# Patient Record
Sex: FEMALE | Race: WHITE | HISPANIC OR LATINO
[De-identification: names, ages, dates, MRNs, and addresses within clinical notes are randomized per-mention and may not be internally consistent; named-entity substitution may affect disease eponyms.]

---

## 2017-02-11 ENCOUNTER — HOSPITAL (OUTPATIENT)
Dept: OTHER | Age: 22
End: 2017-02-11
Attending: EMERGENCY MEDICINE

## 2017-02-11 LAB
CONTROL LINE: PRESENT
Lab: CLEAR
UA APPEAR POC: ABNORMAL
UA BILI POC: ABNORMAL
UA BLOOD POC: ABNORMAL
UA COLOR POC: ABNORMAL
UA GLUCOSE POC: NEGATIVE
UA KETONES POC: ABNORMAL
UA LEUK EST POC: ABNORMAL
UA NITRITE POC: POSITIVE
UA PH POC: 6 (ref 5–7)
UA PROTEIN POC: ABNORMAL
UA SPEC GRAV POC: 1.03 (ref 1.01–1.02)
UA UROBILIN POC: 1 MG/DL
URINE PREG POC: NEGATIVE

## 2017-03-06 ENCOUNTER — HOSPITAL (OUTPATIENT)
Dept: OTHER | Age: 22
End: 2017-03-06
Attending: EMERGENCY MEDICINE

## 2017-03-06 LAB
CONTROL LINE: PRESENT
Lab: CLEAR
UA APPEAR POC: ABNORMAL
UA BILI POC: ABNORMAL
UA BLOOD POC: NEGATIVE
UA COLOR POC: YELLOW
UA GLUCOSE POC: NEGATIVE
UA KETONES POC: NEGATIVE
UA LEUK EST POC: NEGATIVE
UA NITRITE POC: NEGATIVE
UA PH POC: 6 (ref 5–7)
UA PROTEIN POC: ABNORMAL
UA SPEC GRAV POC: 1.02 (ref 1.01–1.02)
UA UROBILIN POC: 1 MG/DL
URINE PREG POC: NEGATIVE

## 2017-03-08 ENCOUNTER — HOSPITAL (OUTPATIENT)
Dept: OTHER | Age: 22
End: 2017-03-08
Attending: FAMILY MEDICINE

## 2017-03-08 LAB
A/G RATIO_: 1.4
ABS LYMPH: 1.8 K/CUMM (ref 1–3.5)
ABS MONO: 0.5 K/CUMM (ref 0.1–0.8)
ABS NEUTRO: 6.5 K/CUMM (ref 2–8)
ALBUMIN: 4.2 G/DL (ref 3.5–5)
ALK PHOS: 89 UNIT/L (ref 50–124)
ALT/GPT: 9 UNIT/L (ref 0–55)
ANION GAP SERPL CALC-SCNC: 13 MEQ/L (ref 10–20)
AST/GOT: 15 UNIT/L (ref 5–34)
BASOPHIL: 0 % (ref 0–1)
BILI TOTAL: 0.9 MG/DL (ref 0.2–1)
BUN SERPL-MCNC: 10 MG/DL (ref 6–20)
CALCIUM: 9.2 MG/DL (ref 8.4–10.2)
CHLORIDE: 107 MEQ/L (ref 97–107)
CREATININE: 0.84 MG/DL (ref 0.6–1.3)
DIFF_TYPE?: NORMAL
EOSINOPHIL: 1 % (ref 0–6)
GLOBULIN_: 3.1 G/DL (ref 2–4.1)
GLUCOSE LVL: 114 MG/DL (ref 70–99)
HCT VFR BLD CALC: 41 % (ref 33–45)
HEMOLYSIS 2+: NEGATIVE
HGB BLD-MCNC: 13.5 G/DL (ref 11–15)
IMMATURE GRAN: 0.2 % (ref 0–0.3)
INSTR WBC: 8.8 K/CUMM (ref 4–11)
LIPEMIC 3+: NEGATIVE
LYMPHOCYTE: 20 %
MCH RBC QN AUTO: 30 PG (ref 25–35)
MCHC RBC AUTO-ENTMCNC: 33 G/DL (ref 32–37)
MCV RBC AUTO: 90 FL (ref 78–97)
MONOCYTE: 5 %
NEUTROPHIL: 74 %
NRBC BLD MANUAL-RTO: 0 % (ref 0–0.2)
PLATELET: 384 K/CUMM (ref 150–450)
POTASSIUM: 4.2 MEQ/L (ref 3.5–5.1)
RBC # BLD: 4.49 M/CUMM (ref 3.7–5.2)
RDW: 12.9 % (ref 11.5–14.5)
SODIUM: 140 MEQ/L (ref 136–145)
TCO2: 24 MEQ/L (ref 19–29)
TOTAL PROTEIN: 7.3 G/DL (ref 6.4–8.3)
WBC # BLD: 8.8 K/CUMM (ref 4–11)

## 2017-03-18 ENCOUNTER — HOSPITAL (OUTPATIENT)
Dept: OTHER | Age: 22
End: 2017-03-18
Attending: NURSE PRACTITIONER

## 2017-05-25 ENCOUNTER — HOSPITAL (OUTPATIENT)
Dept: OTHER | Age: 22
End: 2017-05-25
Attending: NURSE PRACTITIONER

## 2017-05-25 LAB
CONTROL LINE: PRESENT
Lab: CLEAR
UA APPEAR POC: CLEAR
UA BILI POC: NEGATIVE
UA BLOOD POC: NEGATIVE
UA COLOR POC: YELLOW
UA GLUCOSE POC: NEGATIVE
UA KETONES POC: NEGATIVE
UA LEUK EST POC: NEGATIVE
UA NITRITE POC: NEGATIVE
UA PH POC: 6.5 (ref 5–7)
UA PROTEIN POC: NEGATIVE
UA SPEC GRAV POC: 1.02 (ref 1.01–1.02)
UA UROBILIN POC: 0.2 MG/DL
URINE PREG POC: NEGATIVE

## 2017-07-06 ENCOUNTER — HOSPITAL (OUTPATIENT)
Dept: OTHER | Age: 22
End: 2017-07-06
Attending: EMERGENCY MEDICINE

## 2017-07-06 LAB
CONTROL LINE: PRESENT
Lab: CLEAR
UA APPEAR POC: ABNORMAL
UA BILI POC: NEGATIVE
UA BLOOD POC: NEGATIVE
UA COLOR POC: YELLOW
UA GLUCOSE POC: NEGATIVE
UA KETONES POC: NEGATIVE
UA LEUK EST POC: NEGATIVE
UA NITRITE POC: NEGATIVE
UA PH POC: 7.5 (ref 5–7)
UA PROTEIN POC: ABNORMAL
UA SPEC GRAV POC: 1.02 (ref 1.01–1.02)
UA UROBILIN POC: 0.2 MG/DL
URINE PREG POC: NEGATIVE

## 2017-10-08 ENCOUNTER — HOSPITAL (OUTPATIENT)
Dept: OTHER | Age: 22
End: 2017-10-08
Attending: PHYSICIAN ASSISTANT

## 2018-06-06 ENCOUNTER — OFFICE VISIT (OUTPATIENT)
Dept: OBGYN CLINIC | Facility: CLINIC | Age: 23
End: 2018-06-06

## 2018-06-06 VITALS
WEIGHT: 207 LBS | SYSTOLIC BLOOD PRESSURE: 120 MMHG | DIASTOLIC BLOOD PRESSURE: 80 MMHG | HEIGHT: 60 IN | RESPIRATION RATE: 12 BRPM | TEMPERATURE: 98 F | BODY MASS INDEX: 40.64 KG/M2 | HEART RATE: 80 BPM

## 2018-06-06 DIAGNOSIS — N89.8 VAGINAL DISCHARGE: ICD-10-CM

## 2018-06-06 DIAGNOSIS — Z11.3 SCREEN FOR SEXUALLY TRANSMITTED DISEASES: ICD-10-CM

## 2018-06-06 DIAGNOSIS — R10.2 ACUTE PELVIC PAIN: Primary | ICD-10-CM

## 2018-06-06 DIAGNOSIS — Z87.42 HISTORY OF OVARIAN CYST: ICD-10-CM

## 2018-06-06 DIAGNOSIS — L70.9 ACNE, UNSPECIFIED ACNE TYPE: ICD-10-CM

## 2018-06-06 DIAGNOSIS — Z32.00 POSSIBLE PREGNANCY, NOT CONFIRMED: ICD-10-CM

## 2018-06-06 DIAGNOSIS — N91.2 AMENORRHEA: ICD-10-CM

## 2018-06-06 DIAGNOSIS — N92.6 IRREGULAR MENSES: ICD-10-CM

## 2018-06-06 DIAGNOSIS — L68.0 HIRSUTISM: ICD-10-CM

## 2018-06-06 PROCEDURE — 87660 TRICHOMONAS VAGIN DIR PROBE: CPT | Performed by: NURSE PRACTITIONER

## 2018-06-06 PROCEDURE — 87510 GARDNER VAG DNA DIR PROBE: CPT | Performed by: NURSE PRACTITIONER

## 2018-06-06 PROCEDURE — 81025 URINE PREGNANCY TEST: CPT | Performed by: NURSE PRACTITIONER

## 2018-06-06 PROCEDURE — 87591 N.GONORRHOEAE DNA AMP PROB: CPT | Performed by: NURSE PRACTITIONER

## 2018-06-06 PROCEDURE — 88175 CYTOPATH C/V AUTO FLUID REDO: CPT | Performed by: NURSE PRACTITIONER

## 2018-06-06 PROCEDURE — 99204 OFFICE O/P NEW MOD 45 MIN: CPT | Performed by: NURSE PRACTITIONER

## 2018-06-06 PROCEDURE — 87480 CANDIDA DNA DIR PROBE: CPT | Performed by: NURSE PRACTITIONER

## 2018-06-06 PROCEDURE — 87491 CHLMYD TRACH DNA AMP PROBE: CPT | Performed by: NURSE PRACTITIONER

## 2018-06-06 NOTE — PROGRESS NOTES
GYN H&P     2018  12:56 PM    CC: Patient is here to establish care/Discuss her discharge and pain    HPI: Patient is a 26year old  here with complaints of abnormal vaginal discharge.  Describes as \"increased yellow discharge with odor\" for pa status: Never Smoker    Smokeless tobacco: Never Used    Alcohol use No    Drug use: No    Sexual activity: Yes    Partners: Male     Other Topics Concern    Caffeine Concern No    Exercise No    Seat Belt Yes     Social History Narrative   None on file Patient is 26year old female   1. Acute pelvic pain    2. Possible pregnancy, not confirmed    3. Amenorrhea    4. History of ovarian cyst    5. Vaginal discharge    6. Irregular menses    7. Hirsutism    8. Acne, unspecified acne type    9.  Screen for se Future    7. Hirsutism    - [facility]; Future  - ESTRADIOL; Future  - LH (LUTEINIZING HORMONE); Future  - PROLACTIN; Future  - TSH W REFLEX TO FREE T4; Future  - TESTOSTERONE,FREE AND TOTAL; Future  - DEHYDROEPIANDROSTERONE SULFATE; Future    8.  Acne, unspecifie

## 2018-06-06 NOTE — PROGRESS NOTES
Pt states she has irregular periods. LMP was in April. Has unprotected sex with eriche. Pt states she is having discharge starting this morning. Area feels warm and burning but no itching.

## 2018-06-07 RX ORDER — FLUCONAZOLE 150 MG/1
TABLET ORAL
Qty: 2 TABLET | Refills: 0 | Status: SHIPPED | OUTPATIENT
Start: 2018-06-07 | End: 2018-07-17 | Stop reason: ALTCHOICE

## 2018-06-18 ENCOUNTER — APPOINTMENT (OUTPATIENT)
Dept: LAB | Age: 23
End: 2018-06-18
Attending: NURSE PRACTITIONER
Payer: COMMERCIAL

## 2018-06-18 DIAGNOSIS — N92.6 IRREGULAR MENSES: ICD-10-CM

## 2018-06-18 DIAGNOSIS — L68.0 HIRSUTISM: ICD-10-CM

## 2018-06-18 DIAGNOSIS — L70.9 ACNE, UNSPECIFIED ACNE TYPE: ICD-10-CM

## 2018-06-18 DIAGNOSIS — N91.2 AMENORRHEA: ICD-10-CM

## 2018-06-18 PROCEDURE — 83001 ASSAY OF GONADOTROPIN (FSH): CPT

## 2018-06-18 PROCEDURE — 82627 DEHYDROEPIANDROSTERONE: CPT

## 2018-06-18 PROCEDURE — 83002 ASSAY OF GONADOTROPIN (LH): CPT

## 2018-06-18 PROCEDURE — 36415 COLL VENOUS BLD VENIPUNCTURE: CPT

## 2018-06-18 PROCEDURE — 84146 ASSAY OF PROLACTIN: CPT

## 2018-06-18 PROCEDURE — 84443 ASSAY THYROID STIM HORMONE: CPT

## 2018-06-18 PROCEDURE — 82670 ASSAY OF TOTAL ESTRADIOL: CPT

## 2018-06-18 PROCEDURE — 84402 ASSAY OF FREE TESTOSTERONE: CPT

## 2018-06-18 PROCEDURE — 84403 ASSAY OF TOTAL TESTOSTERONE: CPT

## 2018-06-22 RX ORDER — DROSPIRENONE AND ETHINYL ESTRADIOL 0.02-3(28)
1 KIT ORAL DAILY
Qty: 1 PACKAGE | Refills: 2 | Status: SHIPPED | OUTPATIENT
Start: 2018-06-22

## 2018-07-17 ENCOUNTER — HOSPITAL ENCOUNTER (OUTPATIENT)
Age: 23
Discharge: HOME | End: 2018-07-17
Attending: FAMILY MEDICINE
Payer: COMMERCIAL

## 2018-07-17 VITALS
RESPIRATION RATE: 18 BRPM | HEIGHT: 60 IN | WEIGHT: 200 LBS | HEART RATE: 72 BPM | SYSTOLIC BLOOD PRESSURE: 156 MMHG | DIASTOLIC BLOOD PRESSURE: 86 MMHG | OXYGEN SATURATION: 99 % | TEMPERATURE: 98 F | BODY MASS INDEX: 39.27 KG/M2
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
## 2018-07-17 PROCEDURE — 99204 OFFICE O/P NEW MOD 45 MIN: CPT

## 2018-07-17 PROCEDURE — 99213 OFFICE O/P EST LOW 20 MIN: CPT

## 2018-07-17 RX ORDER — PREDNISONE 20 MG/1
40 TABLET ORAL DAILY
Qty: 10 TABLET | Refills: 0 | Status: SHIPPED | OUTPATIENT
Start: 2018-07-17 | End: 2018-07-22

## 2018-07-17 NOTE — ED INITIAL ASSESSMENT (HPI)
Pt presents today with several insect bites throughout her body. Pt states that she had been staying at a motel and switched rooms and then noticed the bites. Pt states that the bites are itchy. The bites are swollen and red.  Pt denies any fevers or other

## 2018-07-17 NOTE — ED PROVIDER NOTES
Patient Seen in: [address] [facility]    History   Patient presents with:   Insect Bite    Stated Complaint: HOT AND ITCHY BITES ON ARM FOR 5 DAYS    HPI    27-year-old female presents to the immediate care today with the chief compla intact. Fundi benign. Ears: normal TM's and external ear canals both ears  Nose: Nares normal. Septum midline. Mucosa normal. No drainage or sinus tenderness.   Throat: lips, mucosa, and tongue normal; teeth and gums normal and no lip, tongue or throat swe

## 2018-09-24 ENCOUNTER — CHARTING TRANS (OUTPATIENT)
Dept: OTHER | Age: 23
End: 2018-09-24

## 2018-09-24 ENCOUNTER — LAB SERVICES (OUTPATIENT)
Dept: OTHER | Age: 23
End: 2018-09-24

## 2018-09-24 ENCOUNTER — MYAURORA ACCOUNT LINK (OUTPATIENT)
Dept: OTHER | Age: 23
End: 2018-09-24

## 2018-09-24 LAB
BILIRUBIN URINE: NEGATIVE
BLOOD URINE: NEGATIVE
CLARITY: CLEAR
COLOR: YELLOW
GLUCOSE QUALITATIVE U: ABNORMAL
KETONES, URINE: ABNORMAL
LEUKOCYTE ESTERASE URINE: NEGATIVE
NITRITE URINE: NEGATIVE
PH URINE: 6.5 (ref 5–7)
SPECIFIC GRAVITY URINE: 1.02 (ref 1–1.03)
URINE PROTEIN, QUAL (DIPSTICK): NEGATIVE
UROBILINOGEN URINE: ABNORMAL

## 2018-09-25 ENCOUNTER — CHARTING TRANS (OUTPATIENT)
Dept: OTHER | Age: 23
End: 2018-09-25

## 2018-09-25 ENCOUNTER — IMAGING SERVICES (OUTPATIENT)
Dept: OTHER | Age: 23
End: 2018-09-25

## 2018-09-26 ENCOUNTER — LAB SERVICES (OUTPATIENT)
Dept: OTHER | Age: 23
End: 2018-09-26

## 2018-09-26 LAB
FINAL REPORT: NORMAL
HEMATOCRIT: 35 % (ref 34–45)
HEMOGLOBIN: 11.2 G/DL (ref 11.2–15.7)
MEAN CORPUSCULAR HGB CONCENTRATION: 32 % (ref 32–36)
MEAN CORPUSCULAR HGB: 30.4 PG (ref 27–34)
MEAN CORPUSCULAR VOLUME: 94.9 FL (ref 79–95)
MEAN PLATELET VOLUME: 9.6 FL (ref 8.6–12.4)
PLATELET COUNT: 336 10*3/UL (ref 150–400)
RED BLOOD CELL COUNT: 3.69 10*6/UL (ref 3.7–5.2)
RED CELL DISTRIBUTION WIDTH: 12.9 % (ref 11.3–14.8)
WHITE BLOOD CELL COUNT: 9 10*3/UL (ref 4–10)

## 2018-09-27 LAB
ABO + RH BLD: NORMAL
BLD GP AB SCN SERPL QL: NEGATIVE
GLUCOSE 1H P 50 G GLC PO SERPL-MCNC: 133 MG/DL (ref 70–130)
HBV SURFACE AG SERPL QL IA: NEGATIVE
HIV1+2 AB SERPL QL IA: NEGATIVE
RUBV IGG SERPL QL IA: NORMAL [IU]/ML

## 2018-09-30 LAB — RPR SER QL: NORMAL

## 2018-10-04 ENCOUNTER — CHARTING TRANS (OUTPATIENT)
Dept: OTHER | Age: 23
End: 2018-10-04

## 2018-10-04 ENCOUNTER — LAB SERVICES (OUTPATIENT)
Dept: OTHER | Age: 23
End: 2018-10-04

## 2018-10-04 LAB
GLUCOSE 1H P 100 G GLC PO SERPL-MCNC: 222 MG/DL (ref 60–180)
GLUCOSE 2H P 100 G GLC PO SERPL-MCNC: 170 MG/DL (ref 70–155)
GLUCOSE 3H P 100 G GLC PO SERPL-MCNC: 74 MG/DL (ref 70–140)
GLUCOSE P FAST SERPL-MCNC: 95 MG/DL (ref 60–95)

## 2018-10-05 ENCOUNTER — CHARTING TRANS (OUTPATIENT)
Dept: OTHER | Age: 23
End: 2018-10-05

## 2018-10-09 ENCOUNTER — CHARTING TRANS (OUTPATIENT)
Dept: OTHER | Age: 23
End: 2018-10-09

## 2018-10-09 ENCOUNTER — MYAURORA ACCOUNT LINK (OUTPATIENT)
Dept: OTHER | Age: 23
End: 2018-10-09

## 2018-10-15 ENCOUNTER — CHARTING TRANS (OUTPATIENT)
Dept: OTHER | Age: 23
End: 2018-10-15

## 2018-10-16 ENCOUNTER — CHARTING TRANS (OUTPATIENT)
Dept: OTHER | Age: 23
End: 2018-10-16

## 2018-10-23 ENCOUNTER — CHARTING TRANS (OUTPATIENT)
Dept: OTHER | Age: 23
End: 2018-10-23

## 2018-11-20 ENCOUNTER — CHARTING TRANS (OUTPATIENT)
Dept: OTHER | Age: 23
End: 2018-11-20

## 2018-11-26 ENCOUNTER — CHARTING TRANS (OUTPATIENT)
Dept: OTHER | Age: 23
End: 2018-11-26

## 2018-11-28 VITALS — WEIGHT: 240.08 LBS | SYSTOLIC BLOOD PRESSURE: 130 MMHG | DIASTOLIC BLOOD PRESSURE: 82 MMHG

## 2018-12-01 ENCOUNTER — PRIOR ORIGINAL RECORDS (OUTPATIENT)
Dept: HEALTH INFORMATION MANAGEMENT | Facility: OTHER | Age: 23
End: 2018-12-01

## 2018-12-04 ENCOUNTER — LAB SERVICES (OUTPATIENT)
Dept: LAB | Age: 23
End: 2018-12-04

## 2018-12-04 ENCOUNTER — OB CHECK (OUTPATIENT)
Dept: OBGYN | Age: 23
End: 2018-12-04

## 2018-12-04 VITALS — WEIGHT: 243 LBS | DIASTOLIC BLOOD PRESSURE: 86 MMHG | SYSTOLIC BLOOD PRESSURE: 128 MMHG

## 2018-12-04 DIAGNOSIS — Z34.02 ENCOUNTER FOR SUPERVISION OF NORMAL FIRST PREGNANCY IN SECOND TRIMESTER: Primary | ICD-10-CM

## 2018-12-04 DIAGNOSIS — O09.90 SUPERVISION OF HIGH RISK PREGNANCY, ANTEPARTUM: Primary | ICD-10-CM

## 2018-12-04 LAB
BASOPHIL %: 0.1 % (ref 0–1.2)
BASOPHIL ABSOLUTE #: 0 10*3/UL (ref 0–0.1)
DIFFERENTIAL TYPE: ABNORMAL
EOSINOPHIL %: 0.2 % (ref 0–10)
EOSINOPHIL ABSOLUTE #: 0 10*3/UL (ref 0–0.5)
HEMATOCRIT: 36 % (ref 34–45)
HEMOGLOBIN: 11.6 G/DL (ref 11.2–15.7)
LYMPH PERCENT: 15.7 % (ref 20.5–51.1)
LYMPHOCYTE ABSOLUTE #: 1.5 10*3/UL (ref 1.2–3.4)
MEAN CORPUSCULAR HGB CONCENTRATION: 32.2 % (ref 32–36)
MEAN CORPUSCULAR HGB: 30.1 PG (ref 27–34)
MEAN CORPUSCULAR VOLUME: 93.5 FL (ref 79–95)
MEAN PLATELET VOLUME: 9.8 FL (ref 8.6–12.4)
MONOCYTE ABSOLUTE #: 0.5 10*3/UL (ref 0.2–0.9)
MONOCYTE PERCENT: 5.7 % (ref 4.3–12.9)
NEUTROPHIL ABSOLUTE #: 7.4 10*3/UL (ref 1.4–6.5)
NEUTROPHIL PERCENT: 78.3 % (ref 34–73.5)
PLATELET COUNT: 300 10*3/UL (ref 150–400)
RED BLOOD CELL COUNT: 3.85 10*6/UL (ref 3.7–5.2)
RED CELL DISTRIBUTION WIDTH: 13.8 % (ref 11.3–14.8)
WHITE BLOOD CELL COUNT: 9.5 10*3/UL (ref 4–10)

## 2018-12-04 PROCEDURE — 0502F SUBSEQUENT PRENATAL CARE: CPT | Performed by: OBSTETRICS & GYNECOLOGY

## 2018-12-04 PROCEDURE — 85025 COMPLETE CBC W/AUTO DIFF WBC: CPT | Performed by: OBSTETRICS & GYNECOLOGY

## 2018-12-04 PROCEDURE — 86703 HIV-1/HIV-2 1 RESULT ANTBDY: CPT | Performed by: OBSTETRICS & GYNECOLOGY

## 2018-12-04 PROCEDURE — 36415 COLL VENOUS BLD VENIPUNCTURE: CPT | Performed by: OBSTETRICS & GYNECOLOGY

## 2018-12-04 RX ORDER — BLOOD-GLUCOSE METER
EACH MISCELLANEOUS
COMMUNITY
Start: 2018-10-05 | End: 2019-01-21 | Stop reason: ALTCHOICE

## 2018-12-04 RX ORDER — GLUCOSAMINE HCL/CHONDROITIN SU 500-400 MG
CAPSULE ORAL
COMMUNITY
Start: 2018-10-05 | End: 2019-01-21 | Stop reason: ALTCHOICE

## 2018-12-04 RX ORDER — LANCETS
EACH MISCELLANEOUS
COMMUNITY
Start: 2018-10-05 | End: 2019-01-21 | Stop reason: ALTCHOICE

## 2018-12-05 LAB — HIV1+2 AB SERPL QL IA: NEGATIVE

## 2018-12-18 ENCOUNTER — OB CHECK (OUTPATIENT)
Dept: OBGYN | Age: 23
End: 2018-12-18

## 2018-12-18 VITALS
WEIGHT: 245 LBS | SYSTOLIC BLOOD PRESSURE: 130 MMHG | BODY MASS INDEX: 48.1 KG/M2 | DIASTOLIC BLOOD PRESSURE: 84 MMHG | HEIGHT: 60 IN

## 2018-12-18 DIAGNOSIS — O09.90 SUPERVISION OF HIGH RISK PREGNANCY, ANTEPARTUM: Primary | ICD-10-CM

## 2018-12-18 PROBLEM — O09.30 LATE PRENATAL CARE: Status: ACTIVE | Noted: 2018-12-18

## 2018-12-18 PROBLEM — E66.812 CLASS 2 SEVERE OBESITY DUE TO EXCESS CALORIES WITH SERIOUS COMORBIDITY AND BODY MASS INDEX (BMI) OF 39.0 TO 39.9 IN ADULT (CMD): Status: ACTIVE | Noted: 2018-12-18

## 2018-12-18 PROBLEM — E66.01 CLASS 2 SEVERE OBESITY DUE TO EXCESS CALORIES WITH SERIOUS COMORBIDITY AND BODY MASS INDEX (BMI) OF 39.0 TO 39.9 IN ADULT (CMD): Status: RESOLVED | Noted: 2018-12-18 | Resolved: 2018-12-18

## 2018-12-18 PROBLEM — E66.812 CLASS 2 SEVERE OBESITY DUE TO EXCESS CALORIES WITH SERIOUS COMORBIDITY AND BODY MASS INDEX (BMI) OF 39.0 TO 39.9 IN ADULT (CMD): Status: RESOLVED | Noted: 2018-12-18 | Resolved: 2018-12-18

## 2018-12-18 PROBLEM — E66.09 CLASS 2 OBESITY DUE TO EXCESS CALORIES WITH BODY MASS INDEX (BMI) OF 39.0 TO 39.9 IN ADULT: Status: ACTIVE | Noted: 2018-12-18

## 2018-12-18 PROBLEM — Z23 NEED FOR TDAP VACCINATION: Status: ACTIVE | Noted: 2018-12-18

## 2018-12-18 PROBLEM — O99.210 OBESITY IN PREGNANCY, ANTEPARTUM: Status: ACTIVE | Noted: 2018-12-18

## 2018-12-18 PROBLEM — E66.01 CLASS 2 SEVERE OBESITY DUE TO EXCESS CALORIES WITH SERIOUS COMORBIDITY AND BODY MASS INDEX (BMI) OF 39.0 TO 39.9 IN ADULT (CMD): Status: ACTIVE | Noted: 2018-12-18

## 2018-12-18 PROBLEM — Z23 NEED FOR INFLUENZA VACCINATION: Status: ACTIVE | Noted: 2018-12-18

## 2018-12-18 PROBLEM — E66.812 CLASS 2 OBESITY DUE TO EXCESS CALORIES WITH BODY MASS INDEX (BMI) OF 39.0 TO 39.9 IN ADULT: Status: ACTIVE | Noted: 2018-12-18

## 2018-12-18 PROCEDURE — 0502F SUBSEQUENT PRENATAL CARE: CPT | Performed by: OBSTETRICS & GYNECOLOGY

## 2018-12-18 SDOH — HEALTH STABILITY: MENTAL HEALTH: HOW OFTEN DO YOU HAVE A DRINK CONTAINING ALCOHOL?: NEVER

## 2019-01-01 ENCOUNTER — EXTERNAL RECORD (OUTPATIENT)
Dept: HEALTH INFORMATION MANAGEMENT | Facility: OTHER | Age: 24
End: 2019-01-01

## 2019-01-02 ENCOUNTER — OB CHECK (OUTPATIENT)
Dept: OBGYN | Age: 24
End: 2019-01-02

## 2019-01-02 VITALS
DIASTOLIC BLOOD PRESSURE: 110 MMHG | WEIGHT: 252.38 LBS | HEIGHT: 60 IN | BODY MASS INDEX: 49.55 KG/M2 | SYSTOLIC BLOOD PRESSURE: 160 MMHG

## 2019-01-02 DIAGNOSIS — O09.93 SUPERVISION OF HIGH RISK PREGNANCY IN THIRD TRIMESTER: Primary | ICD-10-CM

## 2019-01-02 DIAGNOSIS — O13.3 GESTATIONAL HYPERTENSION, THIRD TRIMESTER: ICD-10-CM

## 2019-01-02 DIAGNOSIS — O24.419 GESTATIONAL DIABETES MELLITUS (GDM), ANTEPARTUM, GESTATIONAL DIABETES METHOD OF CONTROL UNSPECIFIED: ICD-10-CM

## 2019-01-02 PROCEDURE — 0502F SUBSEQUENT PRENATAL CARE: CPT | Performed by: OBSTETRICS & GYNECOLOGY

## 2019-01-02 RX ORDER — VITAMIN A ACETATE, BETA CAROTENE, ASCORBIC ACID, CHOLECALCIFEROL, .ALPHA.-TOCOPHEROL ACETATE, DL-, THIAMINE MONONITRATE, RIBOFLAVIN, NIACINAMIDE, PYRIDOXINE HYDROCHLORIDE, FOLIC ACID, CYANOCOBALAMIN, CALCIUM CARBONATE, FERROUS FUMARATE, ZINC OXIDE, CUPRIC OXIDE 3080; 12; 120; 400; 1; 1.84; 3; 20; 22; 920; 25; 200; 27; 10; 2 [IU]/1; UG/1; MG/1; [IU]/1; MG/1; MG/1; MG/1; MG/1; MG/1; [IU]/1; MG/1; MG/1; MG/1; MG/1; MG/1
1 TABLET, FILM COATED ORAL DAILY
COMMUNITY
End: 2019-06-20

## 2019-01-03 ENCOUNTER — APPOINTMENT (OUTPATIENT)
Dept: OBGYN | Age: 24
End: 2019-01-03

## 2019-01-11 ENCOUNTER — TELEPHONE (OUTPATIENT)
Dept: SCHEDULING | Age: 24
End: 2019-01-11

## 2019-01-17 ENCOUNTER — APPOINTMENT (OUTPATIENT)
Dept: OBGYN | Age: 24
End: 2019-01-17

## 2019-01-17 ENCOUNTER — TELEPHONE (OUTPATIENT)
Dept: OBGYN | Age: 24
End: 2019-01-17

## 2019-01-21 ENCOUNTER — APPOINTMENT (OUTPATIENT)
Dept: OBGYN | Age: 24
End: 2019-01-21

## 2019-01-21 ENCOUNTER — OFFICE VISIT (OUTPATIENT)
Dept: OBGYN | Age: 24
End: 2019-01-21

## 2019-01-21 VITALS
WEIGHT: 231 LBS | DIASTOLIC BLOOD PRESSURE: 80 MMHG | SYSTOLIC BLOOD PRESSURE: 120 MMHG | HEIGHT: 60 IN | BODY MASS INDEX: 45.35 KG/M2

## 2019-01-21 DIAGNOSIS — Z48.89 ENCOUNTER FOR POSTOPERATIVE WOUND CARE: Primary | ICD-10-CM

## 2019-01-21 PROCEDURE — 99024 POSTOP FOLLOW-UP VISIT: CPT | Performed by: OBSTETRICS & GYNECOLOGY

## 2019-01-21 SDOH — HEALTH STABILITY: MENTAL HEALTH: HOW OFTEN DO YOU HAVE A DRINK CONTAINING ALCOHOL?: NEVER

## 2019-01-24 ENCOUNTER — APPOINTMENT (OUTPATIENT)
Dept: OBGYN | Age: 24
End: 2019-01-24

## 2019-01-31 ENCOUNTER — APPOINTMENT (OUTPATIENT)
Dept: OBGYN | Age: 24
End: 2019-01-31

## 2019-02-07 ENCOUNTER — APPOINTMENT (OUTPATIENT)
Dept: OBGYN | Age: 24
End: 2019-02-07

## 2019-02-20 ENCOUNTER — OFFICE VISIT (OUTPATIENT)
Dept: FAMILY MEDICINE | Age: 24
End: 2019-02-20

## 2019-02-20 ENCOUNTER — TELEPHONE (OUTPATIENT)
Dept: FAMILY MEDICINE | Age: 24
End: 2019-02-20

## 2019-02-20 VITALS
WEIGHT: 240 LBS | TEMPERATURE: 98.2 F | DIASTOLIC BLOOD PRESSURE: 100 MMHG | RESPIRATION RATE: 18 BRPM | HEIGHT: 60 IN | BODY MASS INDEX: 47.12 KG/M2 | SYSTOLIC BLOOD PRESSURE: 144 MMHG | OXYGEN SATURATION: 98 % | HEART RATE: 106 BPM

## 2019-02-20 DIAGNOSIS — R10.84 GENERALIZED ABDOMINAL PAIN: Primary | ICD-10-CM

## 2019-02-20 DIAGNOSIS — F43.10 PTSD (POST-TRAUMATIC STRESS DISORDER): ICD-10-CM

## 2019-02-20 DIAGNOSIS — F12.90 MARIJUANA USE: ICD-10-CM

## 2019-02-20 PROCEDURE — 99204 OFFICE O/P NEW MOD 45 MIN: CPT | Performed by: FAMILY MEDICINE

## 2019-02-20 ASSESSMENT — ENCOUNTER SYMPTOMS
EYES NEGATIVE: 1
NERVOUS/ANXIOUS: 1
ABDOMINAL PAIN: 1
ENDOCRINE NEGATIVE: 1
CONSTITUTIONAL NEGATIVE: 1
RESPIRATORY NEGATIVE: 1
NEUROLOGICAL NEGATIVE: 1
HEMATOLOGIC/LYMPHATIC NEGATIVE: 1
ALLERGIC/IMMUNOLOGIC NEGATIVE: 1

## 2019-02-20 ASSESSMENT — PATIENT HEALTH QUESTIONNAIRE - PHQ9: 2. FEELING DOWN, DEPRESSED OR HOPELESS: SEVERAL DAYS

## 2019-02-26 ENCOUNTER — E-ADVICE (OUTPATIENT)
Dept: FAMILY MEDICINE | Age: 24
End: 2019-02-26

## 2019-02-27 ENCOUNTER — TELEPHONE (OUTPATIENT)
Dept: OBGYN | Age: 24
End: 2019-02-27

## 2019-02-28 ENCOUNTER — OFFICE VISIT (OUTPATIENT)
Dept: OBGYN | Age: 24
End: 2019-02-28

## 2019-02-28 VITALS — WEIGHT: 230 LBS | BODY MASS INDEX: 44.92 KG/M2 | DIASTOLIC BLOOD PRESSURE: 90 MMHG | SYSTOLIC BLOOD PRESSURE: 130 MMHG

## 2019-02-28 DIAGNOSIS — R35.0 URINARY FREQUENCY: ICD-10-CM

## 2019-02-28 DIAGNOSIS — R39.9 UTI SYMPTOMS: Primary | ICD-10-CM

## 2019-02-28 LAB
APPEARANCE, POC: CLEAR
BILIRUBIN, POC: NEGATIVE
COLOR, POC: YELLOW
GLUCOSE UR-MCNC: NEGATIVE MG/DL
KETONES, POC: NEGATIVE
NITRITE, POC: NEGATIVE
OCCULT BLOOD, POC: NORMAL
PH UR: 6 [PH] (ref 5–7)
PROT UR-MCNC: NORMAL G/DL
SP GR UR: 1.02 (ref 1–1.03)
UROBILINOGEN UR-MCNC: 0.2 MG/DL (ref 0–1)
WBC (LEUKOCYTE) ESTERASE, POC: NORMAL

## 2019-02-28 PROCEDURE — 87088 URINE BACTERIA CULTURE: CPT | Performed by: NURSE PRACTITIONER

## 2019-02-28 PROCEDURE — 87086 URINE CULTURE/COLONY COUNT: CPT | Performed by: NURSE PRACTITIONER

## 2019-02-28 PROCEDURE — 87186 SC STD MICRODIL/AGAR DIL: CPT | Performed by: NURSE PRACTITIONER

## 2019-02-28 PROCEDURE — 99213 OFFICE O/P EST LOW 20 MIN: CPT | Performed by: NURSE PRACTITIONER

## 2019-02-28 RX ORDER — PHENAZOPYRIDINE HYDROCHLORIDE 100 MG/1
100 TABLET, FILM COATED ORAL 3 TIMES DAILY PRN
Qty: 10 TABLET | Refills: 0 | Status: SHIPPED | OUTPATIENT
Start: 2019-02-28 | End: 2019-06-20

## 2019-02-28 RX ORDER — NITROFURANTOIN 25; 75 MG/1; MG/1
100 CAPSULE ORAL 2 TIMES DAILY
Qty: 14 CAPSULE | Refills: 0 | Status: SHIPPED | OUTPATIENT
Start: 2019-02-28 | End: 2019-03-06 | Stop reason: ALTCHOICE

## 2019-03-01 LAB — FINAL REPORT: ABNORMAL

## 2019-03-04 RX ORDER — BLOOD-GLUCOSE METER
EACH MISCELLANEOUS
COMMUNITY
End: 2019-03-06 | Stop reason: ALTCHOICE

## 2019-03-04 RX ORDER — LANCETS
EACH MISCELLANEOUS
COMMUNITY
End: 2019-04-11 | Stop reason: ALTCHOICE

## 2019-03-06 ENCOUNTER — POSTPARTUM VISIT (OUTPATIENT)
Dept: OBGYN | Age: 24
End: 2019-03-06

## 2019-03-06 VITALS
SYSTOLIC BLOOD PRESSURE: 126 MMHG | WEIGHT: 241 LBS | BODY MASS INDEX: 47.32 KG/M2 | DIASTOLIC BLOOD PRESSURE: 80 MMHG | HEIGHT: 60 IN

## 2019-03-06 DIAGNOSIS — Z11.51 SPECIAL SCREENING EXAMINATION FOR HUMAN PAPILLOMAVIRUS (HPV): ICD-10-CM

## 2019-03-06 DIAGNOSIS — O09.30 LATE PRENATAL CARE: ICD-10-CM

## 2019-03-06 DIAGNOSIS — E66.09 CLASS 2 OBESITY DUE TO EXCESS CALORIES WITHOUT SERIOUS COMORBIDITY WITH BODY MASS INDEX (BMI) OF 39.0 TO 39.9 IN ADULT: ICD-10-CM

## 2019-03-06 DIAGNOSIS — Z87.42 HISTORY OF PCOS: ICD-10-CM

## 2019-03-06 DIAGNOSIS — Z01.419 WELL WOMAN EXAM WITH ROUTINE GYNECOLOGICAL EXAM: ICD-10-CM

## 2019-03-06 DIAGNOSIS — Z23 NEED FOR INFLUENZA VACCINATION: ICD-10-CM

## 2019-03-06 DIAGNOSIS — Z23 NEED FOR TDAP VACCINATION: ICD-10-CM

## 2019-03-06 PROBLEM — E66.812 CLASS 2 OBESITY DUE TO EXCESS CALORIES WITH BODY MASS INDEX (BMI) OF 39.0 TO 39.9 IN ADULT: Status: RESOLVED | Noted: 2018-12-18 | Resolved: 2019-03-06

## 2019-03-06 PROCEDURE — 0503F POSTPARTUM CARE VISIT: CPT | Performed by: OBSTETRICS & GYNECOLOGY

## 2019-03-06 PROCEDURE — 99395 PREV VISIT EST AGE 18-39: CPT | Performed by: OBSTETRICS & GYNECOLOGY

## 2019-03-06 PROCEDURE — 88142 CYTOPATH C/V THIN LAYER: CPT | Performed by: OBSTETRICS & GYNECOLOGY

## 2019-03-06 RX ORDER — ACETAMINOPHEN AND CODEINE PHOSPHATE 120; 12 MG/5ML; MG/5ML
1 SOLUTION ORAL DAILY
Qty: 28 TABLET | Refills: 5
Start: 2019-03-06 | End: 2019-06-26 | Stop reason: SDUPTHER

## 2019-03-08 ENCOUNTER — APPOINTMENT (OUTPATIENT)
Dept: LAB | Age: 24
End: 2019-03-08

## 2019-03-13 ENCOUNTER — APPOINTMENT (OUTPATIENT)
Dept: LAB | Age: 24
End: 2019-03-13

## 2019-03-20 ENCOUNTER — LAB SERVICES (OUTPATIENT)
Dept: LAB | Age: 24
End: 2019-03-20

## 2019-03-20 DIAGNOSIS — Z87.42 HISTORY OF PCOS: ICD-10-CM

## 2019-03-20 LAB — GLUCOSE 2H P MEAL SERPL-MCNC: 129 MG/DL (ref 70–200)

## 2019-03-20 PROCEDURE — 82950 GLUCOSE TEST: CPT | Performed by: OBSTETRICS & GYNECOLOGY

## 2019-03-20 PROCEDURE — 36415 COLL VENOUS BLD VENIPUNCTURE: CPT | Performed by: OBSTETRICS & GYNECOLOGY

## 2019-03-25 LAB — AP REPORT: NORMAL

## 2019-04-05 ENCOUNTER — APPOINTMENT (OUTPATIENT)
Dept: OBGYN | Age: 24
End: 2019-04-05

## 2019-04-11 ENCOUNTER — OFFICE VISIT (OUTPATIENT)
Dept: OBGYN | Age: 24
End: 2019-04-11

## 2019-04-11 VITALS
HEIGHT: 60 IN | WEIGHT: 248 LBS | DIASTOLIC BLOOD PRESSURE: 94 MMHG | BODY MASS INDEX: 48.69 KG/M2 | SYSTOLIC BLOOD PRESSURE: 140 MMHG

## 2019-04-11 DIAGNOSIS — E66.01 CLASS 3 SEVERE OBESITY WITHOUT SERIOUS COMORBIDITY WITH BODY MASS INDEX (BMI) OF 40.0 TO 44.9 IN ADULT, UNSPECIFIED OBESITY TYPE (CMD): ICD-10-CM

## 2019-04-11 DIAGNOSIS — R63.4 WEIGHT LOSS: ICD-10-CM

## 2019-04-11 DIAGNOSIS — E28.2 PCO (POLYCYSTIC OVARIES): Primary | ICD-10-CM

## 2019-04-11 PROCEDURE — 99213 OFFICE O/P EST LOW 20 MIN: CPT | Performed by: OBSTETRICS & GYNECOLOGY

## 2019-04-11 SDOH — HEALTH STABILITY: MENTAL HEALTH: HOW OFTEN DO YOU HAVE A DRINK CONTAINING ALCOHOL?: NEVER

## 2019-04-12 ENCOUNTER — TELEPHONE (OUTPATIENT)
Dept: OBGYN | Age: 24
End: 2019-04-12

## 2019-04-12 DIAGNOSIS — Z78.9 BREASTFEEDING (INFANT): Primary | ICD-10-CM

## 2019-04-12 DIAGNOSIS — Z30.09 FAMILY PLANNING: ICD-10-CM

## 2019-04-12 RX ORDER — ACETAMINOPHEN AND CODEINE PHOSPHATE 120; 12 MG/5ML; MG/5ML
1 SOLUTION ORAL DAILY
Qty: 28 TABLET | Refills: 5 | Status: SHIPPED | OUTPATIENT
Start: 2019-04-12 | End: 2019-06-20 | Stop reason: SDUPTHER

## 2019-04-15 ENCOUNTER — TELEPHONE (OUTPATIENT)
Dept: OBGYN | Age: 24
End: 2019-04-15

## 2019-04-15 ENCOUNTER — TELEPHONE (OUTPATIENT)
Dept: NUTRITION | Age: 24
End: 2019-04-15

## 2019-06-10 ENCOUNTER — APPOINTMENT (OUTPATIENT)
Dept: FAMILY MEDICINE | Age: 24
End: 2019-06-10

## 2019-06-20 ENCOUNTER — OFFICE VISIT (OUTPATIENT)
Dept: OBGYN | Age: 24
End: 2019-06-20

## 2019-06-20 VITALS
BODY MASS INDEX: 47.36 KG/M2 | SYSTOLIC BLOOD PRESSURE: 142 MMHG | HEIGHT: 60 IN | DIASTOLIC BLOOD PRESSURE: 102 MMHG | WEIGHT: 241.25 LBS

## 2019-06-20 DIAGNOSIS — Z30.09 FAMILY PLANNING: Primary | ICD-10-CM

## 2019-06-20 DIAGNOSIS — E28.2 PCO (POLYCYSTIC OVARIES): ICD-10-CM

## 2019-06-20 DIAGNOSIS — Z78.9 BREASTFEEDING (INFANT): ICD-10-CM

## 2019-06-20 PROCEDURE — 99212 OFFICE O/P EST SF 10 MIN: CPT | Performed by: NURSE PRACTITIONER

## 2019-06-20 RX ORDER — ACETAMINOPHEN AND CODEINE PHOSPHATE 120; 12 MG/5ML; MG/5ML
1 SOLUTION ORAL DAILY
Qty: 28 TABLET | Refills: 11 | Status: SHIPPED | OUTPATIENT
Start: 2019-06-20 | End: 2019-08-20 | Stop reason: ALTCHOICE

## 2019-06-24 ENCOUNTER — E-ADVICE (OUTPATIENT)
Dept: FAMILY MEDICINE | Age: 24
End: 2019-06-24

## 2019-06-24 ENCOUNTER — OFFICE VISIT (OUTPATIENT)
Dept: FAMILY MEDICINE | Age: 24
End: 2019-06-24

## 2019-06-24 VITALS
HEART RATE: 97 BPM | HEIGHT: 60 IN | WEIGHT: 244.8 LBS | SYSTOLIC BLOOD PRESSURE: 158 MMHG | TEMPERATURE: 98.1 F | OXYGEN SATURATION: 98 % | RESPIRATION RATE: 16 BRPM | DIASTOLIC BLOOD PRESSURE: 92 MMHG | BODY MASS INDEX: 48.06 KG/M2

## 2019-06-24 DIAGNOSIS — R10.84 GENERALIZED ABDOMINAL PAIN: ICD-10-CM

## 2019-06-24 DIAGNOSIS — F12.90 MARIJUANA USE: Primary | ICD-10-CM

## 2019-06-24 DIAGNOSIS — F43.10 PTSD (POST-TRAUMATIC STRESS DISORDER): ICD-10-CM

## 2019-06-24 PROCEDURE — 99214 OFFICE O/P EST MOD 30 MIN: CPT | Performed by: FAMILY MEDICINE

## 2019-06-24 SDOH — HEALTH STABILITY: MENTAL HEALTH: HOW OFTEN DO YOU HAVE A DRINK CONTAINING ALCOHOL?: NEVER

## 2019-06-24 ASSESSMENT — ENCOUNTER SYMPTOMS
ENDOCRINE NEGATIVE: 1
PSYCHIATRIC NEGATIVE: 1
RESPIRATORY NEGATIVE: 1
ALLERGIC/IMMUNOLOGIC NEGATIVE: 1
NEUROLOGICAL NEGATIVE: 1
CONSTITUTIONAL NEGATIVE: 1
EYES NEGATIVE: 1
HEMATOLOGIC/LYMPHATIC NEGATIVE: 1
ABDOMINAL PAIN: 1

## 2019-06-24 ASSESSMENT — PATIENT HEALTH QUESTIONNAIRE - PHQ9
1. LITTLE INTEREST OR PLEASURE IN DOING THINGS: NOT AT ALL
SUM OF ALL RESPONSES TO PHQ9 QUESTIONS 1 AND 2: 0
SUM OF ALL RESPONSES TO PHQ9 QUESTIONS 1 AND 2: 0
2. FEELING DOWN, DEPRESSED OR HOPELESS: NOT AT ALL

## 2019-06-25 ENCOUNTER — E-ADVICE (OUTPATIENT)
Dept: FAMILY MEDICINE | Age: 24
End: 2019-06-25

## 2019-06-26 ENCOUNTER — LAB SERVICES (OUTPATIENT)
Dept: LAB | Age: 24
End: 2019-06-26

## 2019-06-26 ENCOUNTER — OFFICE VISIT (OUTPATIENT)
Dept: FAMILY MEDICINE | Age: 24
End: 2019-06-26

## 2019-06-26 VITALS
HEIGHT: 60 IN | TEMPERATURE: 97.5 F | BODY MASS INDEX: 47.51 KG/M2 | SYSTOLIC BLOOD PRESSURE: 148 MMHG | DIASTOLIC BLOOD PRESSURE: 104 MMHG | OXYGEN SATURATION: 99 % | HEART RATE: 88 BPM | WEIGHT: 242 LBS | RESPIRATION RATE: 16 BRPM

## 2019-06-26 DIAGNOSIS — Z00.00 PREVENTATIVE HEALTH CARE: Primary | ICD-10-CM

## 2019-06-26 DIAGNOSIS — Z00.00 PREVENTATIVE HEALTH CARE: ICD-10-CM

## 2019-06-26 DIAGNOSIS — I10 ESSENTIAL HYPERTENSION: ICD-10-CM

## 2019-06-26 DIAGNOSIS — E66.01 OBESITY, MORBID, BMI 40.0-49.9 (CMD): ICD-10-CM

## 2019-06-26 DIAGNOSIS — K21.9 GASTROESOPHAGEAL REFLUX DISEASE WITHOUT ESOPHAGITIS: ICD-10-CM

## 2019-06-26 LAB
BASOPHIL %: 0.1 % (ref 0–1.2)
BASOPHIL ABSOLUTE #: 0 10*3/UL (ref 0–0.1)
DIFFERENTIAL TYPE: NORMAL
EOSINOPHIL %: 1.1 % (ref 0–10)
EOSINOPHIL ABSOLUTE #: 0.1 10*3/UL (ref 0–0.5)
HEMATOCRIT: 39.6 % (ref 34–45)
HEMOGLOBIN: 12.9 G/DL (ref 11.2–15.7)
LYMPH PERCENT: 27.1 % (ref 20.5–51.1)
LYMPHOCYTE ABSOLUTE #: 1.9 10*3/UL (ref 1.2–3.4)
MEAN CORPUSCULAR HGB CONCENTRATION: 32.6 % (ref 32–36)
MEAN CORPUSCULAR HGB: 28.5 PG (ref 27–34)
MEAN CORPUSCULAR VOLUME: 87.6 FL (ref 79–95)
MEAN PLATELET VOLUME: 9.6 FL (ref 8.6–12.4)
MONOCYTE ABSOLUTE #: 0.5 10*3/UL (ref 0.2–0.9)
MONOCYTE PERCENT: 6.4 % (ref 4.3–12.9)
NEUTROPHIL ABSOLUTE #: 4.7 10*3/UL (ref 1.4–6.5)
NEUTROPHIL PERCENT: 65.3 % (ref 34–73.5)
PLATELET COUNT: 362 10*3/UL (ref 150–400)
RED BLOOD CELL COUNT: 4.52 10*6/UL (ref 3.7–5.2)
RED CELL DISTRIBUTION WIDTH: 13.8 % (ref 11.3–14.8)
WHITE BLOOD CELL COUNT: 7.2 10*3/UL (ref 4–10)

## 2019-06-26 PROCEDURE — 80061 LIPID PANEL: CPT | Performed by: NURSE PRACTITIONER

## 2019-06-26 PROCEDURE — 80053 COMPREHEN METABOLIC PANEL: CPT | Performed by: NURSE PRACTITIONER

## 2019-06-26 PROCEDURE — 3080F DIAST BP >= 90 MM HG: CPT | Performed by: NURSE PRACTITIONER

## 2019-06-26 PROCEDURE — 36415 COLL VENOUS BLD VENIPUNCTURE: CPT | Performed by: NURSE PRACTITIONER

## 2019-06-26 PROCEDURE — 3077F SYST BP >= 140 MM HG: CPT | Performed by: NURSE PRACTITIONER

## 2019-06-26 PROCEDURE — 85025 COMPLETE CBC W/AUTO DIFF WBC: CPT | Performed by: NURSE PRACTITIONER

## 2019-06-26 PROCEDURE — 99395 PREV VISIT EST AGE 18-39: CPT | Performed by: NURSE PRACTITIONER

## 2019-06-26 SDOH — HEALTH STABILITY: MENTAL HEALTH: HOW OFTEN DO YOU HAVE A DRINK CONTAINING ALCOHOL?: NEVER

## 2019-06-26 ASSESSMENT — ENCOUNTER SYMPTOMS
VOICE CHANGE: 1
CONSTITUTIONAL NEGATIVE: 1
ENDOCRINE NEGATIVE: 1
HEMATOLOGIC/LYMPHATIC NEGATIVE: 1
NEUROLOGICAL NEGATIVE: 1
ABDOMINAL PAIN: 0
VOMITING: 0
EYES NEGATIVE: 1
NERVOUS/ANXIOUS: 1
NAUSEA: 0
ABDOMINAL DISTENTION: 0
COUGH: 0

## 2019-06-26 ASSESSMENT — PATIENT HEALTH QUESTIONNAIRE - PHQ9
1. LITTLE INTEREST OR PLEASURE IN DOING THINGS: NOT AT ALL
SUM OF ALL RESPONSES TO PHQ9 QUESTIONS 1 AND 2: 0
2. FEELING DOWN, DEPRESSED OR HOPELESS: NOT AT ALL
SUM OF ALL RESPONSES TO PHQ9 QUESTIONS 1 AND 2: 0

## 2019-06-26 ASSESSMENT — EDINBURGH POSTNATAL DEPRESSION SCALE (EPDS)
I HAVE BEEN ANXIOUS OR WORRIED FOR NO GOOD REASON: NO, NOT AT ALL
THINGS HAVE BEEN GETTING ON TOP OF ME: NO, I HAVE BEEN COPING AS WELL AS EVER
I HAVE BEEN SO UNHAPPY THAT I HAVE BEEN CRYING: NO, NEVER
I HAVE BEEN ABLE TO LAUGH AND SEE THE FUNNY SIDE OF THINGS: AS MUCH AS I ALWAYS COULD
I HAVE LOOKED FORWARD WITH ENJOYMENT TO THINGS: AS MUCH AS I EVER DID
I HAVE BLAMED MYSELF UNNECESSARILY WHEN THINGS WENT WRONG: NO, NEVER
I HAVE BEEN SO UNHAPPY THAT I HAVE HAD DIFFICULTY SLEEPING: NOT AT ALL
THE THOUGHT OF HARMING MYSELF HAS OCCURRED TO ME: NEVER
I HAVE FELT SCARED OR PANICKY FOR NO GOOD REASON: NO, NOT AT ALL
TOTAL SCORE: 0
I HAVE FELT SAD OR MISERABLE: NO, NOT AT ALL

## 2019-06-27 LAB
ALBUMIN SERPL-MCNC: 4.3 G/DL (ref 3.6–5.1)
ALP SERPL-CCNC: 85 U/L (ref 45–130)
ALT SERPL W/O P-5'-P-CCNC: 16 U/L (ref 4–38)
AST SERPL-CCNC: 19 U/L (ref 14–43)
BILIRUB SERPL-MCNC: 0.8 MG/DL (ref 0–1.3)
BUN SERPL-MCNC: 13 MG/DL (ref 7–20)
CALCIUM SERPL-MCNC: 9 MG/DL (ref 8.6–10.6)
CHLORIDE SERPL-SCNC: 108 MMOL/L (ref 96–107)
CHOLEST SERPL-MCNC: 147 MG/DL (ref 140–200)
CO2 SERPL-SCNC: 22 MMOL/L (ref 22–32)
CREAT SERPL-MCNC: 0.8 MG/DL (ref 0.5–1.4)
GFR SERPL CREATININE-BSD FRML MDRD: >60 ML/MIN/{1.73M2}
GFR SERPL CREATININE-BSD FRML MDRD: >60 ML/MIN/{1.73M2}
GLUCOSE SERPL-MCNC: 146 MG/DL (ref 70–200)
HDLC SERPL-MCNC: 30 MG/DL
LDLC SERPL CALC-MCNC: 88 MG/DL (ref 30–100)
POTASSIUM SERPL-SCNC: 4.1 MMOL/L (ref 3.5–5.3)
PROT SERPL-MCNC: 7.4 G/DL (ref 6.4–8.5)
SODIUM SERPL-SCNC: 141 MMOL/L (ref 136–146)
TRIGL SERPL-MCNC: 144 MG/DL (ref 0–200)

## 2019-06-27 RX ORDER — NIFEDIPINE 30 MG/1
30 TABLET, FILM COATED, EXTENDED RELEASE ORAL DAILY
Qty: 30 TABLET | Refills: 0 | Status: SHIPPED | OUTPATIENT
Start: 2019-06-27 | End: 2019-07-29 | Stop reason: ALTCHOICE

## 2019-06-28 ENCOUNTER — TELEPHONE (OUTPATIENT)
Dept: PEDIATRICS | Age: 24
End: 2019-06-28

## 2019-07-02 ENCOUNTER — OFFICE VISIT (OUTPATIENT)
Dept: OBGYN | Age: 24
End: 2019-07-02

## 2019-07-02 ENCOUNTER — LAB SERVICES (OUTPATIENT)
Dept: LAB | Age: 24
End: 2019-07-02

## 2019-07-02 VITALS
BODY MASS INDEX: 47.51 KG/M2 | SYSTOLIC BLOOD PRESSURE: 138 MMHG | DIASTOLIC BLOOD PRESSURE: 88 MMHG | WEIGHT: 243.25 LBS

## 2019-07-02 DIAGNOSIS — N89.8 VAGINAL ITCHING: Primary | ICD-10-CM

## 2019-07-02 DIAGNOSIS — N89.8 VAGINAL ITCHING: ICD-10-CM

## 2019-07-02 PROCEDURE — 87210 SMEAR WET MOUNT SALINE/INK: CPT | Performed by: OBSTETRICS & GYNECOLOGY

## 2019-07-02 PROCEDURE — 99214 OFFICE O/P EST MOD 30 MIN: CPT | Performed by: OBSTETRICS & GYNECOLOGY

## 2019-07-02 PROCEDURE — 86696 HERPES SIMPLEX TYPE 2 TEST: CPT | Performed by: OBSTETRICS & GYNECOLOGY

## 2019-07-02 PROCEDURE — 87591 N.GONORRHOEAE DNA AMP PROB: CPT | Performed by: OBSTETRICS & GYNECOLOGY

## 2019-07-02 PROCEDURE — 86695 HERPES SIMPLEX TYPE 1 TEST: CPT | Performed by: OBSTETRICS & GYNECOLOGY

## 2019-07-02 PROCEDURE — 36415 COLL VENOUS BLD VENIPUNCTURE: CPT | Performed by: OBSTETRICS & GYNECOLOGY

## 2019-07-02 PROCEDURE — 87491 CHLMYD TRACH DNA AMP PROBE: CPT | Performed by: OBSTETRICS & GYNECOLOGY

## 2019-07-02 RX ORDER — FLUCONAZOLE 150 MG/1
150 TABLET ORAL ONCE
Qty: 1 TABLET | Refills: 0 | Status: SHIPPED | OUTPATIENT
Start: 2019-07-02 | End: 2019-07-02

## 2019-07-03 LAB
25(OH)D3 SERPL-MCNC: PRESENT NG/ML
CLUE CELLS: ABNORMAL
TRICHOMONAS ANTIGEN: NEGATIVE
TRICHOMONAS: ABNORMAL
WP WBC: ABNORMAL

## 2019-07-05 LAB
C TRACH DNA SPEC QL NAA+PROBE: NEGATIVE
HSV1 IGG SERPL QL IA: NEGATIVE
HSV2 IGG SERPL QL IA: NEGATIVE
N GONORRHOEA DNA SPEC QL NAA+PROBE: NEGATIVE

## 2019-07-05 RX ORDER — FLUCONAZOLE 150 MG/1
150 TABLET ORAL ONCE
Qty: 2 TABLET | Refills: 0 | Status: CANCELLED | OUTPATIENT
Start: 2019-07-05 | End: 2019-07-05

## 2019-07-10 ENCOUNTER — TELEPHONE (OUTPATIENT)
Dept: FAMILY MEDICINE | Age: 24
End: 2019-07-10

## 2019-07-11 ENCOUNTER — OFFICE VISIT (OUTPATIENT)
Dept: FAMILY MEDICINE | Age: 24
End: 2019-07-11

## 2019-07-11 VITALS
WEIGHT: 243.38 LBS | HEART RATE: 75 BPM | HEIGHT: 60 IN | RESPIRATION RATE: 16 BRPM | BODY MASS INDEX: 47.78 KG/M2 | SYSTOLIC BLOOD PRESSURE: 132 MMHG | OXYGEN SATURATION: 100 % | TEMPERATURE: 97.9 F | DIASTOLIC BLOOD PRESSURE: 94 MMHG

## 2019-07-11 DIAGNOSIS — I10 ESSENTIAL HYPERTENSION: Primary | ICD-10-CM

## 2019-07-11 PROCEDURE — 3075F SYST BP GE 130 - 139MM HG: CPT | Performed by: NURSE PRACTITIONER

## 2019-07-11 PROCEDURE — 99213 OFFICE O/P EST LOW 20 MIN: CPT | Performed by: NURSE PRACTITIONER

## 2019-07-11 PROCEDURE — 3080F DIAST BP >= 90 MM HG: CPT | Performed by: NURSE PRACTITIONER

## 2019-07-11 RX ORDER — LABETALOL 100 MG/1
100 TABLET, FILM COATED ORAL 2 TIMES DAILY
Qty: 30 TABLET | Refills: 0 | Status: SHIPPED | OUTPATIENT
Start: 2019-07-11 | End: 2019-09-23 | Stop reason: SDUPTHER

## 2019-07-11 SDOH — HEALTH STABILITY: MENTAL HEALTH: HOW OFTEN DO YOU HAVE A DRINK CONTAINING ALCOHOL?: NEVER

## 2019-07-22 ENCOUNTER — E-ADVICE (OUTPATIENT)
Dept: OBGYN | Age: 24
End: 2019-07-22

## 2019-07-22 DIAGNOSIS — E28.2 PCO (POLYCYSTIC OVARIES): ICD-10-CM

## 2019-07-29 DIAGNOSIS — I10 ESSENTIAL HYPERTENSION: ICD-10-CM

## 2019-07-29 RX ORDER — NIFEDIPINE 30 MG/1
30 TABLET, FILM COATED, EXTENDED RELEASE ORAL DAILY
Qty: 30 TABLET | Refills: 0 | OUTPATIENT
Start: 2019-07-29

## 2019-08-05 ENCOUNTER — E-ADVICE (OUTPATIENT)
Dept: OBGYN | Age: 24
End: 2019-08-05

## 2019-08-20 ENCOUNTER — LAB SERVICES (OUTPATIENT)
Dept: LAB | Age: 24
End: 2019-08-20

## 2019-08-20 ENCOUNTER — OFFICE VISIT (OUTPATIENT)
Dept: OBGYN | Age: 24
End: 2019-08-20

## 2019-08-20 VITALS
BODY MASS INDEX: 48.37 KG/M2 | HEIGHT: 60 IN | SYSTOLIC BLOOD PRESSURE: 140 MMHG | DIASTOLIC BLOOD PRESSURE: 100 MMHG | WEIGHT: 246.38 LBS

## 2019-08-20 DIAGNOSIS — N92.6 IRREGULAR PERIODS/MENSTRUAL CYCLES: ICD-10-CM

## 2019-08-20 DIAGNOSIS — R10.30 LOWER ABDOMINAL PAIN: ICD-10-CM

## 2019-08-20 DIAGNOSIS — E66.9 OBESITY, UNSPECIFIED CLASSIFICATION, UNSPECIFIED OBESITY TYPE, UNSPECIFIED WHETHER SERIOUS COMORBIDITY PRESENT: ICD-10-CM

## 2019-08-20 DIAGNOSIS — E28.2 POLYCYSTIC OVARIAN SYNDROME: ICD-10-CM

## 2019-08-20 DIAGNOSIS — E28.2 PCOS (POLYCYSTIC OVARIAN SYNDROME): ICD-10-CM

## 2019-08-20 DIAGNOSIS — N92.6 IRREGULAR MENSTRUATION: ICD-10-CM

## 2019-08-20 DIAGNOSIS — I10 ESSENTIAL HYPERTENSION: ICD-10-CM

## 2019-08-20 DIAGNOSIS — R10.2 PELVIC PAIN IN FEMALE: Primary | ICD-10-CM

## 2019-08-20 DIAGNOSIS — I10 ESSENTIAL (PRIMARY) HYPERTENSION: ICD-10-CM

## 2019-08-20 DIAGNOSIS — E66.9 OBESITY: ICD-10-CM

## 2019-08-20 LAB
APPEARANCE, POC: CLEAR
B-HCG SERPL-ACNC: <2 M[IU]/ML (ref 0–6)
BILIRUBIN, POC: NEGATIVE
COLOR, POC: YELLOW
GLUCOSE UR-MCNC: NEGATIVE MG/DL
KETONES, POC: NEGATIVE
NITRITE, POC: NEGATIVE
OCCULT BLOOD, POC: NEGATIVE
PH UR: 7.5 [PH] (ref 5–7)
PROT UR-MCNC: NEGATIVE G/DL
SP GR UR: 1.02 (ref 1–1.03)
UROBILINOGEN UR-MCNC: 0.2 MG/DL (ref 0–1)
WBC (LEUKOCYTE) ESTERASE, POC: NEGATIVE

## 2019-08-20 PROCEDURE — 3080F DIAST BP >= 90 MM HG: CPT | Performed by: OBSTETRICS & GYNECOLOGY

## 2019-08-20 PROCEDURE — 81002 URINALYSIS NONAUTO W/O SCOPE: CPT | Performed by: OBSTETRICS & GYNECOLOGY

## 2019-08-20 PROCEDURE — 99214 OFFICE O/P EST MOD 30 MIN: CPT | Performed by: OBSTETRICS & GYNECOLOGY

## 2019-08-20 PROCEDURE — 36415 COLL VENOUS BLD VENIPUNCTURE: CPT | Performed by: OBSTETRICS & GYNECOLOGY

## 2019-08-20 PROCEDURE — 84702 CHORIONIC GONADOTROPIN TEST: CPT | Performed by: OBSTETRICS & GYNECOLOGY

## 2019-08-20 PROCEDURE — 3077F SYST BP >= 140 MM HG: CPT | Performed by: OBSTETRICS & GYNECOLOGY

## 2019-08-20 PROCEDURE — 87086 URINE CULTURE/COLONY COUNT: CPT | Performed by: OBSTETRICS & GYNECOLOGY

## 2019-08-20 RX ORDER — VITAMIN A ACETATE, BETA CAROTENE, ASCORBIC ACID, CHOLECALCIFEROL, .ALPHA.-TOCOPHEROL ACETATE, DL-, THIAMINE MONONITRATE, RIBOFLAVIN, NIACINAMIDE, PYRIDOXINE HYDROCHLORIDE, FOLIC ACID, CYANOCOBALAMIN, CALCIUM CARBONATE, FERROUS FUMARATE, ZINC OXIDE, CUPRIC OXIDE 3080; 12; 120; 400; 1; 1.84; 3; 20; 22; 920; 25; 200; 27; 10; 2 [IU]/1; UG/1; MG/1; [IU]/1; MG/1; MG/1; MG/1; MG/1; MG/1; [IU]/1; MG/1; MG/1; MG/1; MG/1; MG/1
1 TABLET, FILM COATED ORAL DAILY
COMMUNITY
End: 2021-01-04 | Stop reason: ALTCHOICE

## 2019-08-20 SDOH — HEALTH STABILITY: MENTAL HEALTH: HOW OFTEN DO YOU HAVE A DRINK CONTAINING ALCOHOL?: NEVER

## 2019-08-21 ENCOUNTER — E-ADVICE (OUTPATIENT)
Dept: OBGYN | Age: 24
End: 2019-08-21

## 2019-08-21 LAB — FINAL REPORT: NORMAL

## 2019-08-22 ENCOUNTER — TELEPHONE (OUTPATIENT)
Dept: FAMILY MEDICINE | Age: 24
End: 2019-08-22

## 2019-08-23 ENCOUNTER — IMAGING SERVICES (OUTPATIENT)
Dept: ULTRASOUND IMAGING | Age: 24
End: 2019-08-23
Attending: OBSTETRICS & GYNECOLOGY

## 2019-08-23 ENCOUNTER — OFFICE VISIT (OUTPATIENT)
Dept: OBGYN | Age: 24
End: 2019-08-23

## 2019-08-23 VITALS
BODY MASS INDEX: 48.49 KG/M2 | SYSTOLIC BLOOD PRESSURE: 130 MMHG | HEIGHT: 60 IN | WEIGHT: 247 LBS | DIASTOLIC BLOOD PRESSURE: 84 MMHG

## 2019-08-23 DIAGNOSIS — E28.2 PCOS (POLYCYSTIC OVARIAN SYNDROME): ICD-10-CM

## 2019-08-23 DIAGNOSIS — R10.2 PELVIC PAIN IN FEMALE: Primary | ICD-10-CM

## 2019-08-23 DIAGNOSIS — R10.30 LOWER ABDOMINAL PAIN: ICD-10-CM

## 2019-08-23 PROCEDURE — 76856 US EXAM PELVIC COMPLETE: CPT | Performed by: RADIOLOGY

## 2019-08-23 PROCEDURE — 99213 OFFICE O/P EST LOW 20 MIN: CPT | Performed by: OBSTETRICS & GYNECOLOGY

## 2019-08-23 PROCEDURE — 76830 TRANSVAGINAL US NON-OB: CPT | Performed by: RADIOLOGY

## 2019-09-23 ENCOUNTER — E-ADVICE (OUTPATIENT)
Dept: FAMILY MEDICINE | Age: 24
End: 2019-09-23

## 2019-09-23 ENCOUNTER — OFFICE VISIT (OUTPATIENT)
Dept: FAMILY MEDICINE | Age: 24
End: 2019-09-23

## 2019-09-23 ENCOUNTER — E-ADVICE (OUTPATIENT)
Dept: OBGYN | Age: 24
End: 2019-09-23

## 2019-09-23 VITALS
HEART RATE: 101 BPM | WEIGHT: 248.6 LBS | SYSTOLIC BLOOD PRESSURE: 137 MMHG | DIASTOLIC BLOOD PRESSURE: 99 MMHG | HEIGHT: 60 IN | OXYGEN SATURATION: 96 % | BODY MASS INDEX: 48.81 KG/M2 | TEMPERATURE: 98.2 F | RESPIRATION RATE: 16 BRPM

## 2019-09-23 DIAGNOSIS — E61.1 IRON DEFICIENCY: ICD-10-CM

## 2019-09-23 DIAGNOSIS — E61.2 MAGNESIUM DEFICIENCY: ICD-10-CM

## 2019-09-23 DIAGNOSIS — E55.9 VITAMIN D DEFICIENCY: ICD-10-CM

## 2019-09-23 DIAGNOSIS — E78.9 LIPID DISORDER: ICD-10-CM

## 2019-09-23 DIAGNOSIS — E53.8 VITAMIN B12 DEFICIENCY: ICD-10-CM

## 2019-09-23 DIAGNOSIS — R73.09 ELEVATED GLUCOSE LEVEL: ICD-10-CM

## 2019-09-23 DIAGNOSIS — I10 ESSENTIAL HYPERTENSION: ICD-10-CM

## 2019-09-23 DIAGNOSIS — E03.8 SUBCLINICAL HYPOTHYROIDISM: ICD-10-CM

## 2019-09-23 DIAGNOSIS — Z00.00 WELL ADULT EXAM: Primary | ICD-10-CM

## 2019-09-23 DIAGNOSIS — E28.2 PCOS (POLYCYSTIC OVARIAN SYNDROME): ICD-10-CM

## 2019-09-23 DIAGNOSIS — E88.819 INSULIN RESISTANCE: ICD-10-CM

## 2019-09-23 DIAGNOSIS — R53.83 FATIGUE, UNSPECIFIED TYPE: ICD-10-CM

## 2019-09-23 DIAGNOSIS — R79.82 CRP ELEVATED: ICD-10-CM

## 2019-09-23 PROCEDURE — 3080F DIAST BP >= 90 MM HG: CPT | Performed by: FAMILY MEDICINE

## 2019-09-23 PROCEDURE — 3075F SYST BP GE 130 - 139MM HG: CPT | Performed by: FAMILY MEDICINE

## 2019-09-23 PROCEDURE — 99214 OFFICE O/P EST MOD 30 MIN: CPT | Performed by: FAMILY MEDICINE

## 2019-09-23 RX ORDER — SPIRONOLACTONE 25 MG/1
25 TABLET ORAL DAILY
Qty: 90 TABLET | Refills: 1 | Status: SHIPPED | OUTPATIENT
Start: 2019-09-23 | End: 2021-01-04 | Stop reason: ALTCHOICE

## 2019-09-23 RX ORDER — LABETALOL 100 MG/1
100 TABLET, FILM COATED ORAL 2 TIMES DAILY
Qty: 180 TABLET | Refills: 1 | Status: SHIPPED | OUTPATIENT
Start: 2019-09-23 | End: 2021-01-28 | Stop reason: ALTCHOICE

## 2019-09-23 RX ORDER — LANCETS
EACH MISCELLANEOUS
COMMUNITY
Start: 2018-10-05 | End: 2021-01-04 | Stop reason: ALTCHOICE

## 2019-09-23 ASSESSMENT — ENCOUNTER SYMPTOMS
EYES NEGATIVE: 1
VOICE CHANGE: 1
ABDOMINAL PAIN: 0
VOMITING: 0
ENDOCRINE NEGATIVE: 1
COUGH: 0
HEMATOLOGIC/LYMPHATIC NEGATIVE: 1
ABDOMINAL DISTENTION: 0
NERVOUS/ANXIOUS: 1
CONSTITUTIONAL NEGATIVE: 1
NAUSEA: 0
NEUROLOGICAL NEGATIVE: 1

## 2019-09-23 ASSESSMENT — PATIENT HEALTH QUESTIONNAIRE - PHQ9
SUM OF ALL RESPONSES TO PHQ9 QUESTIONS 1 AND 2: 0
SUM OF ALL RESPONSES TO PHQ9 QUESTIONS 1 AND 2: 0
2. FEELING DOWN, DEPRESSED OR HOPELESS: NOT AT ALL
1. LITTLE INTEREST OR PLEASURE IN DOING THINGS: NOT AT ALL

## 2019-10-22 ENCOUNTER — TELEPHONE (OUTPATIENT)
Dept: SCHEDULING | Age: 24
End: 2019-10-22

## 2020-01-16 ENCOUNTER — OFFICE VISIT (OUTPATIENT)
Dept: OBGYN | Age: 25
End: 2020-01-16

## 2020-01-16 VITALS
BODY MASS INDEX: 48.1 KG/M2 | WEIGHT: 245 LBS | DIASTOLIC BLOOD PRESSURE: 110 MMHG | HEIGHT: 60 IN | SYSTOLIC BLOOD PRESSURE: 140 MMHG

## 2020-01-16 DIAGNOSIS — N89.8 VAGINAL DISCHARGE: Primary | ICD-10-CM

## 2020-01-16 PROCEDURE — 99214 OFFICE O/P EST MOD 30 MIN: CPT | Performed by: NURSE PRACTITIONER

## 2020-01-16 RX ORDER — ALBUTEROL SULFATE 90 UG/1
AEROSOL, METERED RESPIRATORY (INHALATION)
Refills: 2 | COMMUNITY
Start: 2020-01-06 | End: 2021-01-04 | Stop reason: ALTCHOICE

## 2020-01-16 RX ORDER — FLUCONAZOLE 150 MG/1
TABLET ORAL
Qty: 2 TABLET | Refills: 0 | Status: SHIPPED | OUTPATIENT
Start: 2020-01-16 | End: 2020-08-03 | Stop reason: ALTCHOICE

## 2020-03-09 ENCOUNTER — APPOINTMENT (OUTPATIENT)
Dept: OBGYN | Age: 25
End: 2020-03-09

## 2020-03-16 ENCOUNTER — OFFICE VISIT (OUTPATIENT)
Dept: OBGYN | Age: 25
End: 2020-03-16

## 2020-03-16 DIAGNOSIS — N89.8 VAGINAL DISCHARGE: Primary | ICD-10-CM

## 2020-03-16 PROCEDURE — 99213 OFFICE O/P EST LOW 20 MIN: CPT | Performed by: NURSE PRACTITIONER

## 2020-07-22 ENCOUNTER — TELEPHONE (OUTPATIENT)
Dept: OBGYN | Age: 25
End: 2020-07-22

## 2020-07-22 RX ORDER — NITROFURANTOIN 25; 75 MG/1; MG/1
100 CAPSULE ORAL 2 TIMES DAILY
Qty: 10 CAPSULE | Refills: 0 | Status: SHIPPED | OUTPATIENT
Start: 2020-07-22 | End: 2021-01-04 | Stop reason: ALTCHOICE

## 2020-08-03 ENCOUNTER — TELEPHONE (OUTPATIENT)
Dept: OBGYN | Age: 25
End: 2020-08-03

## 2020-08-03 RX ORDER — FLUCONAZOLE 150 MG/1
TABLET ORAL
Qty: 2 TABLET | Refills: 0 | Status: SHIPPED | OUTPATIENT
Start: 2020-08-03 | End: 2021-01-04 | Stop reason: ALTCHOICE

## 2020-12-29 ENCOUNTER — TELEPHONE (OUTPATIENT)
Dept: FAMILY MEDICINE | Age: 25
End: 2020-12-29

## 2020-12-30 ENCOUNTER — E-ADVICE (OUTPATIENT)
Dept: FAMILY MEDICINE | Age: 25
End: 2020-12-30

## 2021-01-04 ENCOUNTER — LAB SERVICES (OUTPATIENT)
Dept: LAB | Age: 26
End: 2021-01-04

## 2021-01-04 ENCOUNTER — OFFICE VISIT (OUTPATIENT)
Dept: FAMILY MEDICINE | Age: 26
End: 2021-01-04

## 2021-01-04 VITALS
HEIGHT: 60 IN | HEART RATE: 76 BPM | DIASTOLIC BLOOD PRESSURE: 118 MMHG | TEMPERATURE: 98.1 F | SYSTOLIC BLOOD PRESSURE: 150 MMHG | OXYGEN SATURATION: 97 % | WEIGHT: 265 LBS | RESPIRATION RATE: 20 BRPM | BODY MASS INDEX: 52.03 KG/M2

## 2021-01-04 DIAGNOSIS — L03.031 INFECTION OF NAIL BED OF TOE OF RIGHT FOOT: ICD-10-CM

## 2021-01-04 DIAGNOSIS — N63.0 BREAST LUMP OR MASS: ICD-10-CM

## 2021-01-04 DIAGNOSIS — I10 ESSENTIAL HYPERTENSION: ICD-10-CM

## 2021-01-04 DIAGNOSIS — Z00.00 WELL ADULT EXAM: ICD-10-CM

## 2021-01-04 DIAGNOSIS — Z00.00 WELL ADULT EXAM: Primary | ICD-10-CM

## 2021-01-04 PROCEDURE — 3080F DIAST BP >= 90 MM HG: CPT | Performed by: NURSE PRACTITIONER

## 2021-01-04 PROCEDURE — 3077F SYST BP >= 140 MM HG: CPT | Performed by: NURSE PRACTITIONER

## 2021-01-04 PROCEDURE — 99395 PREV VISIT EST AGE 18-39: CPT | Performed by: NURSE PRACTITIONER

## 2021-01-04 PROCEDURE — 80053 COMPREHEN METABOLIC PANEL: CPT | Performed by: NURSE PRACTITIONER

## 2021-01-04 PROCEDURE — 83036 HEMOGLOBIN GLYCOSYLATED A1C: CPT | Performed by: NURSE PRACTITIONER

## 2021-01-04 PROCEDURE — 80061 LIPID PANEL: CPT | Performed by: NURSE PRACTITIONER

## 2021-01-04 PROCEDURE — 36415 COLL VENOUS BLD VENIPUNCTURE: CPT | Performed by: NURSE PRACTITIONER

## 2021-01-04 PROCEDURE — 99213 OFFICE O/P EST LOW 20 MIN: CPT | Performed by: NURSE PRACTITIONER

## 2021-01-04 RX ORDER — AMLODIPINE BESYLATE 5 MG/1
5 TABLET ORAL DAILY
Qty: 90 TABLET | Refills: 0 | Status: SHIPPED | OUTPATIENT
Start: 2021-01-04 | End: 2021-01-28 | Stop reason: ALTCHOICE

## 2021-01-04 SDOH — HEALTH STABILITY: MENTAL HEALTH: HOW OFTEN DO YOU HAVE A DRINK CONTAINING ALCOHOL?: NEVER

## 2021-01-04 ASSESSMENT — PATIENT HEALTH QUESTIONNAIRE - PHQ9
1. LITTLE INTEREST OR PLEASURE IN DOING THINGS: NOT AT ALL
CLINICAL INTERPRETATION OF PHQ9 SCORE: NO FURTHER SCREENING NEEDED
CLINICAL INTERPRETATION OF PHQ2 SCORE: NO FURTHER SCREENING NEEDED
2. FEELING DOWN, DEPRESSED OR HOPELESS: NOT AT ALL
SUM OF ALL RESPONSES TO PHQ9 QUESTIONS 1 AND 2: 0
SUM OF ALL RESPONSES TO PHQ9 QUESTIONS 1 AND 2: 0

## 2021-01-04 ASSESSMENT — ENCOUNTER SYMPTOMS
FATIGUE: 0
POLYPHAGIA: 0
PHOTOPHOBIA: 0
BRUISES/BLEEDS EASILY: 0
ALLERGIC/IMMUNOLOGIC NEGATIVE: 1
POLYDIPSIA: 0
APPETITE CHANGE: 0
DIAPHORESIS: 0
ADENOPATHY: 0
EYE ITCHING: 0
GASTROINTESTINAL NEGATIVE: 1
RESPIRATORY NEGATIVE: 1
NEUROLOGICAL NEGATIVE: 1
EYE DISCHARGE: 0
ACTIVITY CHANGE: 0
EYE REDNESS: 0
EYE PAIN: 0
UNEXPECTED WEIGHT CHANGE: 1

## 2021-01-05 LAB
ALBUMIN SERPL-MCNC: 4.6 G/DL (ref 3.6–5.1)
ALP SERPL-CCNC: 119 U/L (ref 45–130)
ALT SERPL W/O P-5'-P-CCNC: 46 U/L (ref 4–38)
AST SERPL-CCNC: 55 U/L (ref 14–43)
BILIRUB SERPL-MCNC: 0.7 MG/DL (ref 0–1.3)
BUN SERPL-MCNC: 11 MG/DL (ref 7–20)
CALCIUM SERPL-MCNC: 10.2 MG/DL (ref 8.6–10.6)
CHLORIDE SERPL-SCNC: 104 MMOL/L (ref 96–107)
CHOLEST SERPL-MCNC: 217 MG/DL (ref 140–200)
CO2 SERPL-SCNC: 26 MMOL/L (ref 22–32)
CREAT SERPL-MCNC: 0.9 MG/DL (ref 0.5–1.4)
EST. AVERAGE GLUCOSE BLD GHB EST-MCNC: 125 MG/DL (ref 0–154)
GFR SERPL CREATININE-BSD FRML MDRD: >60 ML/MIN/{1.73M2}
GFR SERPL CREATININE-BSD FRML MDRD: >60 ML/MIN/{1.73M2}
GLUCOSE SERPL-MCNC: 132 MG/DL (ref 70–200)
HBA1C MFR BLD: 6 % (ref 4.2–6)
HDLC SERPL-MCNC: 36 MG/DL
LDLC SERPL CALC-MCNC: 142 MG/DL (ref 30–100)
POTASSIUM SERPL-SCNC: 4.6 MMOL/L (ref 3.5–5.3)
PROT SERPL-MCNC: 8.2 G/DL (ref 6.4–8.5)
SODIUM SERPL-SCNC: 139 MMOL/L (ref 136–146)
TRIGL SERPL-MCNC: 194 MG/DL (ref 0–200)

## 2021-01-21 ENCOUNTER — APPOINTMENT (OUTPATIENT)
Dept: ULTRASOUND IMAGING | Age: 26
End: 2021-01-21
Attending: NURSE PRACTITIONER

## 2021-01-28 ENCOUNTER — OFFICE VISIT (OUTPATIENT)
Dept: FAMILY MEDICINE | Age: 26
End: 2021-01-28

## 2021-01-28 VITALS
OXYGEN SATURATION: 98 % | DIASTOLIC BLOOD PRESSURE: 100 MMHG | WEIGHT: 265 LBS | RESPIRATION RATE: 20 BRPM | HEIGHT: 60 IN | HEART RATE: 90 BPM | SYSTOLIC BLOOD PRESSURE: 148 MMHG | BODY MASS INDEX: 52.03 KG/M2 | TEMPERATURE: 99 F

## 2021-01-28 DIAGNOSIS — I10 ESSENTIAL HYPERTENSION: Primary | ICD-10-CM

## 2021-01-28 PROCEDURE — 99213 OFFICE O/P EST LOW 20 MIN: CPT | Performed by: NURSE PRACTITIONER

## 2021-01-28 PROCEDURE — 3080F DIAST BP >= 90 MM HG: CPT | Performed by: NURSE PRACTITIONER

## 2021-01-28 PROCEDURE — 3077F SYST BP >= 140 MM HG: CPT | Performed by: NURSE PRACTITIONER

## 2021-01-28 RX ORDER — AMLODIPINE BESYLATE AND BENAZEPRIL HYDROCHLORIDE 5; 20 MG/1; MG/1
1 CAPSULE ORAL DAILY
Qty: 30 CAPSULE | Refills: 0 | Status: SHIPPED | OUTPATIENT
Start: 2021-01-28 | End: 2021-02-18 | Stop reason: DRUGHIGH

## 2021-01-28 SDOH — HEALTH STABILITY: MENTAL HEALTH: HOW OFTEN DO YOU HAVE A DRINK CONTAINING ALCOHOL?: NEVER

## 2021-02-17 ENCOUNTER — TELEPHONE (OUTPATIENT)
Dept: FAMILY MEDICINE | Age: 26
End: 2021-02-17

## 2021-02-18 ENCOUNTER — TELEPHONE (OUTPATIENT)
Dept: FAMILY MEDICINE | Age: 26
End: 2021-02-18

## 2021-02-18 ENCOUNTER — TELEPHONE (OUTPATIENT)
Dept: OBGYN | Age: 26
End: 2021-02-18

## 2021-02-18 ENCOUNTER — OFFICE VISIT (OUTPATIENT)
Dept: FAMILY MEDICINE | Age: 26
End: 2021-02-18

## 2021-02-18 VITALS
SYSTOLIC BLOOD PRESSURE: 142 MMHG | WEIGHT: 258 LBS | OXYGEN SATURATION: 99 % | HEART RATE: 72 BPM | BODY MASS INDEX: 50.39 KG/M2 | DIASTOLIC BLOOD PRESSURE: 74 MMHG

## 2021-02-18 DIAGNOSIS — I10 ESSENTIAL HYPERTENSION: Primary | ICD-10-CM

## 2021-02-18 PROCEDURE — 3078F DIAST BP <80 MM HG: CPT | Performed by: NURSE PRACTITIONER

## 2021-02-18 PROCEDURE — 99214 OFFICE O/P EST MOD 30 MIN: CPT | Performed by: NURSE PRACTITIONER

## 2021-02-18 PROCEDURE — 3077F SYST BP >= 140 MM HG: CPT | Performed by: NURSE PRACTITIONER

## 2021-02-18 RX ORDER — AMLODIPINE BESYLATE AND BENAZEPRIL HYDROCHLORIDE 10; 20 MG/1; MG/1
1 CAPSULE ORAL DAILY
Qty: 90 CAPSULE | Refills: 0 | Status: SHIPPED | OUTPATIENT
Start: 2021-02-18 | End: 2021-04-29 | Stop reason: SDUPTHER

## 2021-02-18 RX ORDER — FLUCONAZOLE 150 MG/1
TABLET ORAL
Qty: 2 TABLET | Refills: 0 | Status: SHIPPED | OUTPATIENT
Start: 2021-02-18 | End: 2021-05-19 | Stop reason: SDUPTHER

## 2021-02-18 ASSESSMENT — ENCOUNTER SYMPTOMS
SHORTNESS OF BREATH: 0
HEADACHES: 0
SWEATS: 0
BLURRED VISION: 0
ORTHOPNEA: 0

## 2021-02-23 ENCOUNTER — APPOINTMENT (OUTPATIENT)
Dept: PEDIATRICS | Age: 26
End: 2021-02-23

## 2021-02-23 ENCOUNTER — IMAGING SERVICES (OUTPATIENT)
Dept: ULTRASOUND IMAGING | Age: 26
End: 2021-02-23
Attending: NURSE PRACTITIONER

## 2021-02-23 DIAGNOSIS — N63.0 LUMP OR MASS IN BREAST: Primary | ICD-10-CM

## 2021-02-23 DIAGNOSIS — N63.0 BREAST LUMP OR MASS: ICD-10-CM

## 2021-02-23 PROCEDURE — 76642 ULTRASOUND BREAST LIMITED: CPT | Performed by: RADIOLOGY

## 2021-03-03 ENCOUNTER — APPOINTMENT (OUTPATIENT)
Dept: PEDIATRICS | Age: 26
End: 2021-03-03

## 2021-03-08 ENCOUNTER — TELEPHONE (OUTPATIENT)
Dept: SURGERY | Age: 26
End: 2021-03-08

## 2021-03-10 ENCOUNTER — E-ADVICE (OUTPATIENT)
Dept: FAMILY MEDICINE | Age: 26
End: 2021-03-10

## 2021-03-10 DIAGNOSIS — E28.2 PCO (POLYCYSTIC OVARIES): ICD-10-CM

## 2021-03-23 ENCOUNTER — TELEPHONE (OUTPATIENT)
Dept: OBGYN | Age: 26
End: 2021-03-23

## 2021-03-25 ENCOUNTER — OFFICE VISIT (OUTPATIENT)
Dept: OBGYN | Age: 26
End: 2021-03-25

## 2021-03-25 VITALS
HEIGHT: 60 IN | WEIGHT: 255.13 LBS | BODY MASS INDEX: 50.09 KG/M2 | DIASTOLIC BLOOD PRESSURE: 108 MMHG | SYSTOLIC BLOOD PRESSURE: 152 MMHG

## 2021-03-25 DIAGNOSIS — Z01.419 ENCNTR FOR GYN EXAM (GENERAL) (ROUTINE) W/O ABN FINDINGS: Primary | ICD-10-CM

## 2021-03-25 DIAGNOSIS — Z11.51 SCREENING FOR HUMAN PAPILLOMAVIRUS (HPV): ICD-10-CM

## 2021-03-25 PROCEDURE — 88142 CYTOPATH C/V THIN LAYER: CPT | Performed by: PATHOLOGY

## 2021-03-25 PROCEDURE — 99395 PREV VISIT EST AGE 18-39: CPT | Performed by: NURSE PRACTITIONER

## 2021-03-25 RX ORDER — PNV NO.95/FERROUS FUM/FOLIC AC 28MG-0.8MG
1 TABLET ORAL DAILY
Qty: 30 TABLET | Refills: 11 | Status: SHIPPED | OUTPATIENT
Start: 2021-03-25 | End: 2022-06-08 | Stop reason: ALTCHOICE

## 2021-03-25 RX ORDER — NYSTATIN AND TRIAMCINOLONE ACETONIDE 100000; 1 [USP'U]/G; MG/G
OINTMENT TOPICAL 2 TIMES DAILY
Qty: 30 G | Refills: 0 | Status: SHIPPED | OUTPATIENT
Start: 2021-03-25 | End: 2021-08-16 | Stop reason: SDUPTHER

## 2021-03-25 SDOH — HEALTH STABILITY: MENTAL HEALTH: HOW OFTEN DO YOU HAVE A DRINK CONTAINING ALCOHOL?: NEVER

## 2021-04-01 LAB — AP REPORT: NORMAL

## 2021-04-29 DIAGNOSIS — I10 ESSENTIAL HYPERTENSION: ICD-10-CM

## 2021-04-29 RX ORDER — AMLODIPINE BESYLATE AND BENAZEPRIL HYDROCHLORIDE 10; 20 MG/1; MG/1
1 CAPSULE ORAL DAILY
Qty: 30 CAPSULE | Refills: 0 | Status: SHIPPED | OUTPATIENT
Start: 2021-04-29 | End: 2021-05-19 | Stop reason: SDUPTHER

## 2021-05-11 ENCOUNTER — TELEPHONE (OUTPATIENT)
Dept: OBGYN | Age: 26
End: 2021-05-11

## 2021-05-14 ENCOUNTER — APPOINTMENT (OUTPATIENT)
Dept: OBGYN | Age: 26
End: 2021-05-14

## 2021-05-17 ENCOUNTER — LAB SERVICES (OUTPATIENT)
Dept: LAB | Age: 26
End: 2021-05-17

## 2021-05-17 ENCOUNTER — OFFICE VISIT (OUTPATIENT)
Dept: OBGYN | Age: 26
End: 2021-05-17

## 2021-05-17 VITALS
SYSTOLIC BLOOD PRESSURE: 128 MMHG | BODY MASS INDEX: 49.65 KG/M2 | HEART RATE: 76 BPM | RESPIRATION RATE: 18 BRPM | DIASTOLIC BLOOD PRESSURE: 82 MMHG | WEIGHT: 254.25 LBS | TEMPERATURE: 98 F

## 2021-05-17 DIAGNOSIS — N94.9 VAGINAL SYMPTOM: Primary | ICD-10-CM

## 2021-05-17 DIAGNOSIS — Z13.29 SCREENING FOR THYROID DISORDER: ICD-10-CM

## 2021-05-17 LAB — TSH SERPL DL<=0.05 MIU/L-ACNC: 2.04 M[IU]/L (ref 0.3–4.82)

## 2021-05-17 PROCEDURE — 36415 COLL VENOUS BLD VENIPUNCTURE: CPT | Performed by: NURSE PRACTITIONER

## 2021-05-17 PROCEDURE — 99213 OFFICE O/P EST LOW 20 MIN: CPT | Performed by: NURSE PRACTITIONER

## 2021-05-17 PROCEDURE — 87801 DETECT AGNT MULT DNA AMPLI: CPT | Performed by: PATHOLOGY

## 2021-05-17 PROCEDURE — 84443 ASSAY THYROID STIM HORMONE: CPT | Performed by: NURSE PRACTITIONER

## 2021-05-17 PROCEDURE — 81514 NFCT DS BV&VAGINITIS DNA ALG: CPT | Performed by: PATHOLOGY

## 2021-05-18 LAB
BV BACTERIA DNA VAG QL NAA+PROBE: NEGATIVE
C GLABRATA DNA VAG QL NAA+PROBE: NEGATIVE
C KRUSEI DNA VAG QL NAA+PROBE: NEGATIVE
C TRACH DNA GENITAL QL NAA+PROBE: NEGATIVE
CANDIDA DNA VAG QL NAA+PROBE: POSITIVE
N GONORRHOEA DNA GENITAL QL NAA+PROBE: NEGATIVE
T VAGINALIS DNA GENITAL QL NAA+PROBE: NEGATIVE
T VAGINALIS DNA GENITAL QL NAA+PROBE: NEGATIVE

## 2021-05-19 ENCOUNTER — LAB SERVICES (OUTPATIENT)
Dept: LAB | Age: 26
End: 2021-05-19

## 2021-05-19 ENCOUNTER — OFFICE VISIT (OUTPATIENT)
Dept: FAMILY MEDICINE | Age: 26
End: 2021-05-19

## 2021-05-19 VITALS
HEIGHT: 61 IN | TEMPERATURE: 97.8 F | BODY MASS INDEX: 48.15 KG/M2 | DIASTOLIC BLOOD PRESSURE: 78 MMHG | SYSTOLIC BLOOD PRESSURE: 118 MMHG | HEART RATE: 89 BPM | WEIGHT: 255 LBS

## 2021-05-19 DIAGNOSIS — R73.09 ELEVATED GLUCOSE: ICD-10-CM

## 2021-05-19 DIAGNOSIS — B37.31 YEAST VAGINITIS: Primary | ICD-10-CM

## 2021-05-19 DIAGNOSIS — E28.2 PCO (POLYCYSTIC OVARIES): ICD-10-CM

## 2021-05-19 DIAGNOSIS — I10 ESSENTIAL HYPERTENSION: ICD-10-CM

## 2021-05-19 DIAGNOSIS — E66.01 OBESITY, MORBID, BMI 40.0-49.9 (CMD): ICD-10-CM

## 2021-05-19 PROCEDURE — 83036 HEMOGLOBIN GLYCOSYLATED A1C: CPT | Performed by: NURSE PRACTITIONER

## 2021-05-19 PROCEDURE — 3078F DIAST BP <80 MM HG: CPT | Performed by: NURSE PRACTITIONER

## 2021-05-19 PROCEDURE — 36415 COLL VENOUS BLD VENIPUNCTURE: CPT | Performed by: NURSE PRACTITIONER

## 2021-05-19 PROCEDURE — 99215 OFFICE O/P EST HI 40 MIN: CPT | Performed by: NURSE PRACTITIONER

## 2021-05-19 PROCEDURE — 3074F SYST BP LT 130 MM HG: CPT | Performed by: NURSE PRACTITIONER

## 2021-05-19 RX ORDER — FLUCONAZOLE 150 MG/1
TABLET ORAL
Qty: 2 TABLET | Refills: 0 | Status: SHIPPED | OUTPATIENT
Start: 2021-05-19 | End: 2021-12-27 | Stop reason: ALTCHOICE

## 2021-05-19 RX ORDER — AMLODIPINE BESYLATE AND BENAZEPRIL HYDROCHLORIDE 10; 20 MG/1; MG/1
1 CAPSULE ORAL DAILY
Qty: 90 CAPSULE | Refills: 1 | Status: SHIPPED | OUTPATIENT
Start: 2021-05-19 | End: 2021-12-27 | Stop reason: DRUGHIGH

## 2021-05-20 LAB
EST. AVERAGE GLUCOSE BLD GHB EST-MCNC: 106 MG/DL (ref 0–154)
HBA1C MFR BLD: 5.3 % (ref 4.2–6)

## 2021-07-20 ENCOUNTER — TELEPHONE (OUTPATIENT)
Dept: OBGYN | Age: 26
End: 2021-07-20

## 2021-07-20 RX ORDER — NITROFURANTOIN 25; 75 MG/1; MG/1
100 CAPSULE ORAL 2 TIMES DAILY
Qty: 10 CAPSULE | Refills: 0 | Status: SHIPPED | OUTPATIENT
Start: 2021-07-20 | End: 2021-12-27 | Stop reason: ALTCHOICE

## 2021-07-26 ENCOUNTER — TELEPHONE (OUTPATIENT)
Dept: FAMILY MEDICINE | Age: 26
End: 2021-07-26

## 2021-08-10 ENCOUNTER — TELEPHONE (OUTPATIENT)
Dept: OBGYN | Age: 26
End: 2021-08-10

## 2021-08-10 DIAGNOSIS — N91.2 AMENORRHEA: Primary | ICD-10-CM

## 2021-08-12 ENCOUNTER — NURSE ONLY (OUTPATIENT)
Dept: PEDIATRICS | Age: 26
End: 2021-08-12

## 2021-08-12 ENCOUNTER — TELEPHONE (OUTPATIENT)
Dept: OBGYN | Age: 26
End: 2021-08-12

## 2021-08-12 ENCOUNTER — LAB SERVICES (OUTPATIENT)
Dept: LAB | Age: 26
End: 2021-08-12

## 2021-08-12 VITALS — DIASTOLIC BLOOD PRESSURE: 96 MMHG | SYSTOLIC BLOOD PRESSURE: 162 MMHG

## 2021-08-12 DIAGNOSIS — N91.2 AMENORRHEA: ICD-10-CM

## 2021-08-12 DIAGNOSIS — Z01.30 BLOOD PRESSURE CHECK: Primary | ICD-10-CM

## 2021-08-12 PROCEDURE — 83002 ASSAY OF GONADOTROPIN (LH): CPT | Performed by: NURSE PRACTITIONER

## 2021-08-12 PROCEDURE — 84403 ASSAY OF TOTAL TESTOSTERONE: CPT | Performed by: NURSE PRACTITIONER

## 2021-08-12 PROCEDURE — PSEU8191 DHEA SULFATE: Performed by: CLINICAL MEDICAL LABORATORY

## 2021-08-12 PROCEDURE — 83520 IMMUNOASSAY QUANT NOS NONAB: CPT | Performed by: NURSE PRACTITIONER

## 2021-08-12 PROCEDURE — PSEU9350 ANTI MULLERIAN HORMONE: Performed by: CLINICAL MEDICAL LABORATORY

## 2021-08-12 PROCEDURE — 84146 ASSAY OF PROLACTIN: CPT | Performed by: NURSE PRACTITIONER

## 2021-08-12 PROCEDURE — 82627 DEHYDROEPIANDROSTERONE: CPT | Performed by: NURSE PRACTITIONER

## 2021-08-12 PROCEDURE — 84443 ASSAY THYROID STIM HORMONE: CPT | Performed by: NURSE PRACTITIONER

## 2021-08-12 PROCEDURE — 83520 IMMUNOASSAY QUANT NOS NONAB: CPT | Performed by: CLINICAL MEDICAL LABORATORY

## 2021-08-12 PROCEDURE — 82627 DEHYDROEPIANDROSTERONE: CPT | Performed by: CLINICAL MEDICAL LABORATORY

## 2021-08-12 PROCEDURE — 99211 OFF/OP EST MAY X REQ PHY/QHP: CPT | Performed by: NURSE PRACTITIONER

## 2021-08-12 PROCEDURE — 84402 ASSAY OF FREE TESTOSTERONE: CPT | Performed by: CLINICAL MEDICAL LABORATORY

## 2021-08-12 PROCEDURE — 84144 ASSAY OF PROGESTERONE: CPT | Performed by: NURSE PRACTITIONER

## 2021-08-12 PROCEDURE — 82670 ASSAY OF TOTAL ESTRADIOL: CPT | Performed by: NURSE PRACTITIONER

## 2021-08-12 PROCEDURE — 84402 ASSAY OF FREE TESTOSTERONE: CPT | Performed by: NURSE PRACTITIONER

## 2021-08-12 PROCEDURE — 84702 CHORIONIC GONADOTROPIN TEST: CPT | Performed by: NURSE PRACTITIONER

## 2021-08-12 PROCEDURE — 83001 ASSAY OF GONADOTROPIN (FSH): CPT | Performed by: NURSE PRACTITIONER

## 2021-08-12 PROCEDURE — 36415 COLL VENOUS BLD VENIPUNCTURE: CPT | Performed by: NURSE PRACTITIONER

## 2021-08-12 PROCEDURE — PSEU9838 TESTOSTERONE, FREE, FEMALE OR CHILD: Performed by: CLINICAL MEDICAL LABORATORY

## 2021-08-13 ENCOUNTER — LAB REQUISITION (OUTPATIENT)
Dept: LAB | Age: 26
End: 2021-08-13

## 2021-08-13 DIAGNOSIS — N91.2 AMENORRHEA, UNSPECIFIED: ICD-10-CM

## 2021-08-13 LAB
B-HCG SERPL-ACNC: <2 M[IU]/ML (ref 0–6)
ESTRADIOL SERPL-MCNC: 125 PG/ML
FSH SERPL-ACNC: 1.97 M[IU]/ML (ref 2–25)
LH SERPL-ACNC: 1.97 M[IU]/ML
PROGEST SERPL-MCNC: 6.48 NG/ML
PROLACTIN SERPL-MCNC: 21.2 NG/ML (ref 2.5–26.8)
TESTOST SERPL-MCNC: 105 NG/DL (ref 6–77)
TSH SERPL DL<=0.05 MIU/L-ACNC: 2.39 M[IU]/L (ref 0.3–4.82)

## 2021-08-14 LAB
DHEA-S SERPL-MCNC: 647 MCG/DL (ref 8–391)
DHEA-S SERPL-MCNC: 647 MCG/DL (ref 8–391)

## 2021-08-15 LAB
MIS SERPL-MCNC: 6.37 NG/ML (ref 0.4–16.02)
MIS SERPL-MCNC: 6.37 NG/ML (ref 0.4–16.02)

## 2021-08-16 ENCOUNTER — TELEPHONE (OUTPATIENT)
Dept: OBGYN | Age: 26
End: 2021-08-16

## 2021-08-16 RX ORDER — NYSTATIN AND TRIAMCINOLONE ACETONIDE 100000; 1 [USP'U]/G; MG/G
OINTMENT TOPICAL 2 TIMES DAILY
Qty: 30 G | Refills: 0 | Status: SHIPPED | OUTPATIENT
Start: 2021-08-16 | End: 2022-01-11 | Stop reason: SDUPTHER

## 2021-08-16 RX ORDER — FLUCONAZOLE 150 MG/1
150 TABLET ORAL ONCE
Qty: 2 TABLET | Refills: 0 | Status: SHIPPED | OUTPATIENT
Start: 2021-08-16 | End: 2021-08-16

## 2021-08-17 ENCOUNTER — TELEPHONE (OUTPATIENT)
Dept: OBGYN | Age: 26
End: 2021-08-17

## 2021-08-17 LAB
TESTOST FREE SERPL-MCNC: 4.7 PG/ML (ref 0.8–7.4)
TESTOST FREE SERPL-MCNC: 4.7 PG/ML (ref 0.8–7.4)

## 2021-09-01 ENCOUNTER — LAB SERVICES (OUTPATIENT)
Dept: URGENT CARE | Age: 26
End: 2021-09-01

## 2021-09-01 DIAGNOSIS — Z11.52 ENCOUNTER FOR SCREENING LABORATORY TESTING FOR COVID-19 VIRUS IN ASYMPTOMATIC PATIENT: Primary | ICD-10-CM

## 2021-09-01 DIAGNOSIS — Z01.812 ENCOUNTER FOR SCREENING LABORATORY TESTING FOR COVID-19 VIRUS IN ASYMPTOMATIC PATIENT: Primary | ICD-10-CM

## 2021-12-09 ENCOUNTER — OFFICE VISIT (OUTPATIENT)
Dept: OBGYN | Age: 26
End: 2021-12-09

## 2021-12-09 ENCOUNTER — TELEPHONE (OUTPATIENT)
Dept: OBGYN | Age: 26
End: 2021-12-09

## 2021-12-09 VITALS
WEIGHT: 254 LBS | RESPIRATION RATE: 16 BRPM | DIASTOLIC BLOOD PRESSURE: 86 MMHG | SYSTOLIC BLOOD PRESSURE: 140 MMHG | TEMPERATURE: 98.7 F | BODY MASS INDEX: 47.52 KG/M2 | HEART RATE: 90 BPM

## 2021-12-09 DIAGNOSIS — N89.8 VAGINAL DISCHARGE: ICD-10-CM

## 2021-12-09 DIAGNOSIS — N93.0 PCB (POST COITAL BLEEDING): ICD-10-CM

## 2021-12-09 DIAGNOSIS — N92.6 IRREGULAR PERIODS: Primary | ICD-10-CM

## 2021-12-09 DIAGNOSIS — R10.2 PELVIC PAIN IN FEMALE: ICD-10-CM

## 2021-12-09 DIAGNOSIS — Z11.3 ROUTINE SCREENING FOR STI (SEXUALLY TRANSMITTED INFECTION): ICD-10-CM

## 2021-12-09 PROCEDURE — 81514 NFCT DS BV&VAGINITIS DNA ALG: CPT | Performed by: NURSE PRACTITIONER

## 2021-12-09 PROCEDURE — 99213 OFFICE O/P EST LOW 20 MIN: CPT | Performed by: NURSE PRACTITIONER

## 2021-12-09 PROCEDURE — 87491 CHLMYD TRACH DNA AMP PROBE: CPT | Performed by: NURSE PRACTITIONER

## 2021-12-11 LAB
BV BACTERIA DNA VAG QL NAA+PROBE: NEGATIVE
C GLABRATA DNA VAG QL NAA+PROBE: NEGATIVE
C KRUSEI DNA VAG QL NAA+PROBE: NEGATIVE
C TRACH DNA GENITAL QL NAA+PROBE: NEGATIVE
CANDIDA DNA VAG QL NAA+PROBE: NEGATIVE
N GONORRHOEA DNA GENITAL QL NAA+PROBE: NEGATIVE
T VAGINALIS DNA GENITAL QL NAA+PROBE: NEGATIVE
T VAGINALIS DNA GENITAL QL NAA+PROBE: NEGATIVE

## 2021-12-27 ENCOUNTER — IMAGING SERVICES (OUTPATIENT)
Dept: GENERAL RADIOLOGY | Age: 26
End: 2021-12-27
Attending: NURSE PRACTITIONER

## 2021-12-27 ENCOUNTER — OFFICE VISIT (OUTPATIENT)
Dept: FAMILY MEDICINE | Age: 26
End: 2021-12-27

## 2021-12-27 VITALS
DIASTOLIC BLOOD PRESSURE: 90 MMHG | HEART RATE: 100 BPM | RESPIRATION RATE: 18 BRPM | SYSTOLIC BLOOD PRESSURE: 146 MMHG | OXYGEN SATURATION: 96 %

## 2021-12-27 DIAGNOSIS — I10 PRIMARY HYPERTENSION: ICD-10-CM

## 2021-12-27 DIAGNOSIS — R10.31 ABDOMINAL PAIN, ACUTE, BILATERAL LOWER QUADRANT: ICD-10-CM

## 2021-12-27 DIAGNOSIS — K59.00 CONSTIPATION, UNSPECIFIED CONSTIPATION TYPE: ICD-10-CM

## 2021-12-27 DIAGNOSIS — R10.31 ABDOMINAL PAIN, ACUTE, BILATERAL LOWER QUADRANT: Primary | ICD-10-CM

## 2021-12-27 DIAGNOSIS — R10.32 ABDOMINAL PAIN, ACUTE, BILATERAL LOWER QUADRANT: ICD-10-CM

## 2021-12-27 DIAGNOSIS — K64.4 EXTERNAL HEMORRHOIDS: ICD-10-CM

## 2021-12-27 DIAGNOSIS — R10.32 ABDOMINAL PAIN, ACUTE, BILATERAL LOWER QUADRANT: Primary | ICD-10-CM

## 2021-12-27 PROCEDURE — 3077F SYST BP >= 140 MM HG: CPT | Performed by: NURSE PRACTITIONER

## 2021-12-27 PROCEDURE — 74018 RADEX ABDOMEN 1 VIEW: CPT | Performed by: RADIOLOGY

## 2021-12-27 PROCEDURE — 99214 OFFICE O/P EST MOD 30 MIN: CPT | Performed by: NURSE PRACTITIONER

## 2021-12-27 RX ORDER — HYDROCORTISONE ACETATE 25 MG/1
25 SUPPOSITORY RECTAL 2 TIMES DAILY
Qty: 24 EACH | Refills: 1 | Status: SHIPPED | OUTPATIENT
Start: 2021-12-27 | End: 2022-06-08 | Stop reason: ALTCHOICE

## 2021-12-27 RX ORDER — AMLODIPINE AND BENAZEPRIL HYDROCHLORIDE 10; 40 MG/1; MG/1
1 CAPSULE ORAL DAILY
Qty: 90 CAPSULE | Refills: 1 | Status: SHIPPED | OUTPATIENT
Start: 2021-12-27 | End: 2022-08-06 | Stop reason: SDUPTHER

## 2021-12-27 ASSESSMENT — ENCOUNTER SYMPTOMS
CONSTIPATION: 0
HEADACHES: 0
DIZZINESS: 0
CONSTITUTIONAL NEGATIVE: 1
ABDOMINAL DISTENTION: 0
DIARRHEA: 0
BLOOD IN STOOL: 0
VOMITING: 0
RECTAL PAIN: 1
NAUSEA: 0
ABDOMINAL PAIN: 1

## 2021-12-29 ENCOUNTER — IMAGING SERVICES (OUTPATIENT)
Dept: ULTRASOUND IMAGING | Age: 26
End: 2021-12-29
Attending: NURSE PRACTITIONER

## 2021-12-29 ENCOUNTER — OFFICE VISIT (OUTPATIENT)
Dept: OBGYN | Age: 26
End: 2021-12-29

## 2021-12-29 VITALS — SYSTOLIC BLOOD PRESSURE: 128 MMHG | WEIGHT: 254 LBS | BODY MASS INDEX: 47.52 KG/M2 | DIASTOLIC BLOOD PRESSURE: 82 MMHG

## 2021-12-29 DIAGNOSIS — N92.6 IRREGULAR PERIODS: ICD-10-CM

## 2021-12-29 DIAGNOSIS — N92.6 IRREGULAR PERIODS: Primary | ICD-10-CM

## 2021-12-29 PROCEDURE — 99213 OFFICE O/P EST LOW 20 MIN: CPT | Performed by: NURSE PRACTITIONER

## 2021-12-29 PROCEDURE — 76856 US EXAM PELVIC COMPLETE: CPT | Performed by: RADIOLOGY

## 2021-12-29 PROCEDURE — 76830 TRANSVAGINAL US NON-OB: CPT | Performed by: RADIOLOGY

## 2022-01-10 ENCOUNTER — NURSE ONLY (OUTPATIENT)
Dept: PEDIATRICS | Age: 27
End: 2022-01-10

## 2022-01-10 VITALS
RESPIRATION RATE: 16 BRPM | HEART RATE: 88 BPM | DIASTOLIC BLOOD PRESSURE: 70 MMHG | SYSTOLIC BLOOD PRESSURE: 118 MMHG | OXYGEN SATURATION: 98 %

## 2022-01-10 DIAGNOSIS — I10 HYPERTENSION, UNSPECIFIED TYPE: Primary | ICD-10-CM

## 2022-01-11 ENCOUNTER — TELEPHONE (OUTPATIENT)
Dept: OBGYN | Age: 27
End: 2022-01-11

## 2022-01-11 RX ORDER — NYSTATIN AND TRIAMCINOLONE ACETONIDE 100000; 1 [USP'U]/G; MG/G
OINTMENT TOPICAL 2 TIMES DAILY
Qty: 30 G | Refills: 0 | Status: SHIPPED | OUTPATIENT
Start: 2022-01-11 | End: 2022-06-08 | Stop reason: ALTCHOICE

## 2022-01-11 RX ORDER — FLUCONAZOLE 150 MG/1
150 TABLET ORAL ONCE
Qty: 2 TABLET | Refills: 0 | Status: SHIPPED | OUTPATIENT
Start: 2022-01-11 | End: 2022-01-11

## 2022-01-13 ENCOUNTER — TELEPHONE (OUTPATIENT)
Dept: FAMILY MEDICINE | Age: 27
End: 2022-01-13

## 2022-01-18 ENCOUNTER — OFFICE VISIT (OUTPATIENT)
Dept: OBGYN | Age: 27
End: 2022-01-18

## 2022-01-18 ENCOUNTER — TELEPHONE (OUTPATIENT)
Dept: OBGYN | Age: 27
End: 2022-01-18

## 2022-01-18 VITALS
BODY MASS INDEX: 49.74 KG/M2 | DIASTOLIC BLOOD PRESSURE: 70 MMHG | SYSTOLIC BLOOD PRESSURE: 130 MMHG | WEIGHT: 253.38 LBS | HEIGHT: 60 IN | TEMPERATURE: 97.3 F

## 2022-01-18 DIAGNOSIS — Z01.419 ENCOUNTER FOR GYNECOLOGICAL EXAMINATION WITHOUT ABNORMAL FINDING: ICD-10-CM

## 2022-01-18 DIAGNOSIS — B37.9 YEAST INFECTION: Primary | ICD-10-CM

## 2022-01-18 PROCEDURE — 99214 OFFICE O/P EST MOD 30 MIN: CPT | Performed by: OBSTETRICS & GYNECOLOGY

## 2022-01-18 PROCEDURE — 81514 NFCT DS BV&VAGINITIS DNA ALG: CPT | Performed by: OBSTETRICS & GYNECOLOGY

## 2022-01-20 LAB
BV BACTERIA DNA VAG QL NAA+PROBE: NEGATIVE
C GLABRATA DNA VAG QL NAA+PROBE: NEGATIVE
C KRUSEI DNA VAG QL NAA+PROBE: NEGATIVE
CANDIDA DNA VAG QL NAA+PROBE: NEGATIVE
T VAGINALIS DNA GENITAL QL NAA+PROBE: NEGATIVE

## 2022-02-14 DIAGNOSIS — E28.2 PCO (POLYCYSTIC OVARIES): ICD-10-CM

## 2022-02-23 ENCOUNTER — OFFICE VISIT (OUTPATIENT)
Dept: FAMILY MEDICINE | Age: 27
End: 2022-02-23

## 2022-02-23 ENCOUNTER — LAB SERVICES (OUTPATIENT)
Dept: LAB | Age: 27
End: 2022-02-23

## 2022-02-23 VITALS
RESPIRATION RATE: 18 BRPM | SYSTOLIC BLOOD PRESSURE: 120 MMHG | HEART RATE: 84 BPM | DIASTOLIC BLOOD PRESSURE: 72 MMHG | OXYGEN SATURATION: 99 %

## 2022-02-23 DIAGNOSIS — R19.4 BOWEL HABIT CHANGES: ICD-10-CM

## 2022-02-23 DIAGNOSIS — R61 SWEATING INCREASE: ICD-10-CM

## 2022-02-23 DIAGNOSIS — N92.6 IRREGULAR MENSTRUAL CYCLE: ICD-10-CM

## 2022-02-23 DIAGNOSIS — N92.6 IRREGULAR MENSTRUAL CYCLE: Primary | ICD-10-CM

## 2022-02-23 DIAGNOSIS — R10.12 LEFT UPPER QUADRANT ABDOMINAL PAIN: ICD-10-CM

## 2022-02-23 DIAGNOSIS — H53.9 VISION CHANGES: ICD-10-CM

## 2022-02-23 LAB
ALBUMIN SERPL-MCNC: 4.5 G/DL (ref 3.6–5.1)
ALP SERPL-CCNC: 83 U/L (ref 45–130)
ALT SERPL W/O P-5'-P-CCNC: 17 U/L (ref 4–38)
AMYLASE SERPL-CCNC: 50 U/L (ref 30–110)
AST SERPL-CCNC: 28 U/L (ref 14–43)
BASOPHIL %: 0.5 % (ref 0–1.2)
BASOPHIL ABSOLUTE #: 0 10*3/UL (ref 0–0.1)
BILIRUB SERPL-MCNC: 0.5 MG/DL (ref 0–1.3)
BUN SERPL-MCNC: 13 MG/DL (ref 7–20)
CALCIUM SERPL-MCNC: 9.6 MG/DL (ref 8.6–10.6)
CHLORIDE SERPL-SCNC: 106 MMOL/L (ref 96–107)
CO2 SERPL-SCNC: 26 MMOL/L (ref 22–32)
CREAT SERPL-MCNC: 0.9 MG/DL (ref 0.5–1.4)
DIFFERENTIAL TYPE: ABNORMAL
EOSINOPHIL %: 2.1 % (ref 0–10)
EOSINOPHIL ABSOLUTE #: 0.2 10*3/UL (ref 0–0.5)
EST. AVERAGE GLUCOSE BLD GHB EST-MCNC: 129 MG/DL (ref 0–154)
GFR SERPL CREATININE-BSD FRML MDRD: >60 ML/MIN/{1.73M2}
GFR SERPL CREATININE-BSD FRML MDRD: >60 ML/MIN/{1.73M2}
GLUCOSE SERPL-MCNC: 148 MG/DL (ref 70–200)
HBA1C MFR BLD: 6.1 % (ref 4.2–6)
HEMATOCRIT: 39.8 % (ref 34–45)
HEMOGLOBIN: 13.2 G/DL (ref 11.2–15.7)
IMMATURE GRANULOCYTE ABSOLUTE: 0.05 10*3/UL (ref 0–0.05)
IMMATURE GRANULOCYTE PERCENT: 0.6 % (ref 0–0.5)
LIPASE SERPL-CCNC: 63 U/L (ref 10–250)
LYMPH PERCENT: 29.6 % (ref 20.5–51.1)
LYMPHOCYTE ABSOLUTE #: 2.3 10*3/UL (ref 1.2–3.4)
MEAN CORPUSCULAR HGB CONCENTRATION: 33.2 % (ref 32–36)
MEAN CORPUSCULAR HGB: 30.2 PG (ref 27–34)
MEAN CORPUSCULAR VOLUME: 91.1 FL (ref 79–95)
MEAN PLATELET VOLUME: 9.3 FL (ref 8.6–12.4)
MONOCYTE ABSOLUTE #: 0.5 10*3/UL (ref 0.2–0.9)
MONOCYTE PERCENT: 5.8 % (ref 4.3–12.9)
NEUTROPHIL ABSOLUTE #: 4.9 10*3/UL (ref 1.4–6.5)
NEUTROPHIL PERCENT: 61.4 % (ref 34–73.5)
PLATELET COUNT: 466 10*3/UL (ref 150–400)
POTASSIUM SERPL-SCNC: 4.6 MMOL/L (ref 3.5–5.3)
PROT SERPL-MCNC: 7.7 G/DL (ref 6.4–8.5)
RED BLOOD CELL COUNT: 4.37 10*6/UL (ref 3.7–5.2)
RED CELL DISTRIBUTION WIDTH: 12.4 % (ref 11.3–14.8)
SODIUM SERPL-SCNC: 139 MMOL/L (ref 136–146)
TSH SERPL DL<=0.05 MIU/L-ACNC: 1.29 M[IU]/L (ref 0.3–4.82)
WHITE BLOOD CELL COUNT: 7.9 10*3/UL (ref 4–10)

## 2022-02-23 PROCEDURE — 80050 GENERAL HEALTH PANEL: CPT | Performed by: NURSE PRACTITIONER

## 2022-02-23 PROCEDURE — 83036 HEMOGLOBIN GLYCOSYLATED A1C: CPT | Performed by: NURSE PRACTITIONER

## 2022-02-23 PROCEDURE — 99214 OFFICE O/P EST MOD 30 MIN: CPT | Performed by: NURSE PRACTITIONER

## 2022-02-23 PROCEDURE — 82150 ASSAY OF AMYLASE: CPT | Performed by: NURSE PRACTITIONER

## 2022-02-23 PROCEDURE — 36415 COLL VENOUS BLD VENIPUNCTURE: CPT | Performed by: NURSE PRACTITIONER

## 2022-02-23 PROCEDURE — 83690 ASSAY OF LIPASE: CPT | Performed by: NURSE PRACTITIONER

## 2022-02-23 ASSESSMENT — ENCOUNTER SYMPTOMS
FATIGUE: 0
CHEST TIGHTNESS: 0
ABDOMINAL DISTENTION: 1
LIGHT-HEADEDNESS: 0
DIAPHORESIS: 1
FEVER: 0
BLOOD IN STOOL: 0
NAUSEA: 0
APPETITE CHANGE: 0
DIARRHEA: 1
CONSTIPATION: 0
VOMITING: 0
SHORTNESS OF BREATH: 0
ABDOMINAL PAIN: 1
DIZZINESS: 0
CHILLS: 0
HEADACHES: 1

## 2022-02-24 DIAGNOSIS — R79.89 ELEVATED PLATELET COUNT: ICD-10-CM

## 2022-02-24 DIAGNOSIS — R73.09 ELEVATED HEMOGLOBIN A1C: Primary | ICD-10-CM

## 2022-04-26 ENCOUNTER — TELEPHONE (OUTPATIENT)
Dept: OBGYN | Age: 27
End: 2022-04-26

## 2022-05-04 ENCOUNTER — OFFICE VISIT (OUTPATIENT)
Dept: OBGYN | Age: 27
End: 2022-05-04

## 2022-05-04 ENCOUNTER — LAB SERVICES (OUTPATIENT)
Dept: LAB | Age: 27
End: 2022-05-04

## 2022-05-04 ENCOUNTER — TELEPHONE (OUTPATIENT)
Dept: OBGYN | Age: 27
End: 2022-05-04

## 2022-05-04 VITALS — DIASTOLIC BLOOD PRESSURE: 88 MMHG | SYSTOLIC BLOOD PRESSURE: 142 MMHG

## 2022-05-04 DIAGNOSIS — R79.9 ABNORMAL BLOOD CHEMISTRY: ICD-10-CM

## 2022-05-04 DIAGNOSIS — Z11.3 ROUTINE SCREENING FOR STI (SEXUALLY TRANSMITTED INFECTION): ICD-10-CM

## 2022-05-04 DIAGNOSIS — Z11.8 SCREENING FOR CHLAMYDIAL DISEASE: ICD-10-CM

## 2022-05-04 DIAGNOSIS — Z11.3 ENCOUNTER FOR SCREENING FOR INFECTIONS WITH PREDOMINANTLY SEXUAL MODE OF TRANSMISSION: ICD-10-CM

## 2022-05-04 DIAGNOSIS — N89.8 VAGINAL DISCHARGE: ICD-10-CM

## 2022-05-04 DIAGNOSIS — R73.09 OTHER ABNORMAL GLUCOSE: Primary | ICD-10-CM

## 2022-05-04 DIAGNOSIS — R73.09 ELEVATED HEMOGLOBIN A1C: ICD-10-CM

## 2022-05-04 DIAGNOSIS — N89.8 VAGINAL IRRITATION: Primary | ICD-10-CM

## 2022-05-04 DIAGNOSIS — R79.89 ELEVATED PLATELET COUNT: ICD-10-CM

## 2022-05-04 LAB
BASOPHIL %: 0.3 % (ref 0–1.2)
BASOPHIL ABSOLUTE #: 0 10*3/UL (ref 0–0.1)
DIFFERENTIAL TYPE: ABNORMAL
EOSINOPHIL %: 0.7 % (ref 0–10)
EOSINOPHIL ABSOLUTE #: 0.1 10*3/UL (ref 0–0.5)
HEMATOCRIT: 41.8 % (ref 34–45)
HEMOGLOBIN: 13.3 G/DL (ref 11.2–15.7)
IMMATURE GRANULOCYTE ABSOLUTE: 0.03 10*3/UL (ref 0–0.05)
IMMATURE GRANULOCYTE PERCENT: 0.3 % (ref 0–0.5)
LYMPH PERCENT: 24.1 % (ref 20.5–51.1)
LYMPHOCYTE ABSOLUTE #: 2.3 10*3/UL (ref 1.2–3.4)
MEAN CORPUSCULAR HGB CONCENTRATION: 31.8 % (ref 32–36)
MEAN CORPUSCULAR HGB: 29.8 PG (ref 27–34)
MEAN CORPUSCULAR VOLUME: 93.7 FL (ref 79–95)
MEAN PLATELET VOLUME: 10.1 FL (ref 8.6–12.4)
MONOCYTE ABSOLUTE #: 0.6 10*3/UL (ref 0.2–0.9)
MONOCYTE PERCENT: 5.9 % (ref 4.3–12.9)
NEUTROPHIL ABSOLUTE #: 6.4 10*3/UL (ref 1.4–6.5)
NEUTROPHIL PERCENT: 68.7 % (ref 34–73.5)
PLATELET COUNT: 373 10*3/UL (ref 150–400)
RED BLOOD CELL COUNT: 4.46 10*6/UL (ref 3.7–5.2)
RED CELL DISTRIBUTION WIDTH: 12.7 % (ref 11.3–14.8)
WHITE BLOOD CELL COUNT: 9.3 10*3/UL (ref 4–10)

## 2022-05-04 PROCEDURE — 87340 HEPATITIS B SURFACE AG IA: CPT | Performed by: NURSE PRACTITIONER

## 2022-05-04 PROCEDURE — 87801 DETECT AGNT MULT DNA AMPLI: CPT | Performed by: NURSE PRACTITIONER

## 2022-05-04 PROCEDURE — 85025 COMPLETE CBC W/AUTO DIFF WBC: CPT | Performed by: NURSE PRACTITIONER

## 2022-05-04 PROCEDURE — 36415 COLL VENOUS BLD VENIPUNCTURE: CPT | Performed by: NURSE PRACTITIONER

## 2022-05-04 PROCEDURE — 99213 OFFICE O/P EST LOW 20 MIN: CPT | Performed by: NURSE PRACTITIONER

## 2022-05-04 PROCEDURE — 86592 SYPHILIS TEST NON-TREP QUAL: CPT | Performed by: NURSE PRACTITIONER

## 2022-05-04 PROCEDURE — 86803 HEPATITIS C AB TEST: CPT | Performed by: NURSE PRACTITIONER

## 2022-05-04 PROCEDURE — 86703 HIV-1/HIV-2 1 RESULT ANTBDY: CPT | Performed by: NURSE PRACTITIONER

## 2022-05-04 PROCEDURE — 81514 NFCT DS BV&VAGINITIS DNA ALG: CPT | Performed by: NURSE PRACTITIONER

## 2022-05-04 RX ORDER — FLUCONAZOLE 150 MG/1
TABLET ORAL
Qty: 2 TABLET | Refills: 0 | Status: SHIPPED | OUTPATIENT
Start: 2022-05-04 | End: 2022-06-08 | Stop reason: ALTCHOICE

## 2022-05-05 LAB
BV BACTERIA DNA VAG QL NAA+PROBE: NEGATIVE
C GLABRATA DNA VAG QL NAA+PROBE: NEGATIVE
C KRUSEI DNA VAG QL NAA+PROBE: NEGATIVE
CANDIDA DNA VAG QL NAA+PROBE: NEGATIVE
HBV SURFACE AG SERPL QL IA: NEGATIVE
HEPATITIS C ANTIBODY: NEGATIVE
HIV1+2 AB SERPL QL IA: NEGATIVE
T VAGINALIS DNA GENITAL QL NAA+PROBE: NEGATIVE

## 2022-05-06 LAB
C TRACH DNA GENITAL QL NAA+PROBE: NEGATIVE
N GONORRHOEA DNA GENITAL QL NAA+PROBE: NEGATIVE
RPR SER QL: NORMAL
T VAGINALIS DNA GENITAL QL NAA+PROBE: NEGATIVE

## 2022-06-07 ENCOUNTER — TELEPHONE (OUTPATIENT)
Dept: FAMILY MEDICINE | Age: 27
End: 2022-06-07

## 2022-06-08 ENCOUNTER — LAB REQUISITION (OUTPATIENT)
Dept: LAB | Age: 27
End: 2022-06-08

## 2022-06-08 ENCOUNTER — OFFICE VISIT (OUTPATIENT)
Dept: FAMILY MEDICINE | Age: 27
End: 2022-06-08

## 2022-06-08 ENCOUNTER — LAB SERVICES (OUTPATIENT)
Dept: LAB | Age: 27
End: 2022-06-08

## 2022-06-08 VITALS
SYSTOLIC BLOOD PRESSURE: 124 MMHG | TEMPERATURE: 97.9 F | OXYGEN SATURATION: 98 % | DIASTOLIC BLOOD PRESSURE: 80 MMHG | HEART RATE: 100 BPM | BODY MASS INDEX: 49.38 KG/M2 | HEIGHT: 60 IN | WEIGHT: 251.5 LBS | RESPIRATION RATE: 16 BRPM

## 2022-06-08 DIAGNOSIS — E11.9 TYPE 2 DIABETES MELLITUS WITHOUT COMPLICATION, WITHOUT LONG-TERM CURRENT USE OF INSULIN (CMD): ICD-10-CM

## 2022-06-08 DIAGNOSIS — R73.09 OTHER ABNORMAL GLUCOSE: ICD-10-CM

## 2022-06-08 DIAGNOSIS — Z11.3 ENCOUNTER FOR SCREENING FOR INFECTIONS WITH PREDOMINANTLY SEXUAL MODE OF TRANSMISSION: ICD-10-CM

## 2022-06-08 DIAGNOSIS — E11.9 TYPE 2 DIABETES MELLITUS WITHOUT COMPLICATIONS (CMD): ICD-10-CM

## 2022-06-08 DIAGNOSIS — R79.9 ABNORMAL BLOOD CHEMISTRY: ICD-10-CM

## 2022-06-08 DIAGNOSIS — Z00.01 ENCOUNTER FOR GENERAL ADULT MEDICAL EXAMINATION WITH ABNORMAL FINDINGS: Primary | ICD-10-CM

## 2022-06-08 DIAGNOSIS — I10 ESSENTIAL (PRIMARY) HYPERTENSION: ICD-10-CM

## 2022-06-08 DIAGNOSIS — J06.9 VIRAL UPPER RESPIRATORY TRACT INFECTION: ICD-10-CM

## 2022-06-08 DIAGNOSIS — Z00.01 ENCOUNTER FOR GENERAL ADULT MEDICAL EXAMINATION WITH ABNORMAL FINDINGS: ICD-10-CM

## 2022-06-08 DIAGNOSIS — J06.9 ACUTE UPPER RESPIRATORY INFECTION, UNSPECIFIED: ICD-10-CM

## 2022-06-08 DIAGNOSIS — I10 PRIMARY HYPERTENSION: ICD-10-CM

## 2022-06-08 PROCEDURE — 83036 HEMOGLOBIN GLYCOSYLATED A1C: CPT | Performed by: NURSE PRACTITIONER

## 2022-06-08 PROCEDURE — PSEU10635 2019 NOVEL CORONAVIRUS (SARS-COV-2): Performed by: CLINICAL MEDICAL LABORATORY

## 2022-06-08 PROCEDURE — U0003 INFECTIOUS AGENT DETECTION BY NUCLEIC ACID (DNA OR RNA); SEVERE ACUTE RESPIRATORY SYNDROME CORONAVIRUS 2 (SARS-COV-2) (CORONAVIRUS DISEASE [COVID-19]), AMPLIFIED PROBE TECHNIQUE, MAKING USE OF HIGH THROUGHPUT TECHNOLOGIES AS DESCRIBED BY CMS-2020-01-R: HCPCS | Performed by: CLINICAL MEDICAL LABORATORY

## 2022-06-08 PROCEDURE — U0005 INFEC AGEN DETEC AMPLI PROBE: HCPCS | Performed by: NURSE PRACTITIONER

## 2022-06-08 PROCEDURE — 36415 COLL VENOUS BLD VENIPUNCTURE: CPT | Performed by: NURSE PRACTITIONER

## 2022-06-08 PROCEDURE — U0005 INFEC AGEN DETEC AMPLI PROBE: HCPCS | Performed by: CLINICAL MEDICAL LABORATORY

## 2022-06-08 PROCEDURE — 3074F SYST BP LT 130 MM HG: CPT | Performed by: NURSE PRACTITIONER

## 2022-06-08 PROCEDURE — 3079F DIAST BP 80-89 MM HG: CPT | Performed by: NURSE PRACTITIONER

## 2022-06-08 PROCEDURE — U0003 INFECTIOUS AGENT DETECTION BY NUCLEIC ACID (DNA OR RNA); SEVERE ACUTE RESPIRATORY SYNDROME CORONAVIRUS 2 (SARS-COV-2) (CORONAVIRUS DISEASE [COVID-19]), AMPLIFIED PROBE TECHNIQUE, MAKING USE OF HIGH THROUGHPUT TECHNOLOGIES AS DESCRIBED BY CMS-2020-01-R: HCPCS | Performed by: NURSE PRACTITIONER

## 2022-06-08 PROCEDURE — 82043 UR ALBUMIN QUANTITATIVE: CPT | Performed by: NURSE PRACTITIONER

## 2022-06-08 PROCEDURE — 82570 ASSAY OF URINE CREATININE: CPT | Performed by: NURSE PRACTITIONER

## 2022-06-08 PROCEDURE — 99395 PREV VISIT EST AGE 18-39: CPT | Performed by: NURSE PRACTITIONER

## 2022-06-08 ASSESSMENT — ENCOUNTER SYMPTOMS
CHEST TIGHTNESS: 0
PSYCHIATRIC NEGATIVE: 1
APPETITE CHANGE: 0
RHINORRHEA: 0
HEADACHES: 0
ACTIVITY CHANGE: 0
SHORTNESS OF BREATH: 0
COUGH: 0
FATIGUE: 1
LIGHT-HEADEDNESS: 0
ALLERGIC/IMMUNOLOGIC NEGATIVE: 1
NAUSEA: 0
ABDOMINAL DISTENTION: 0
DIARRHEA: 0
ABDOMINAL PAIN: 0
UNEXPECTED WEIGHT CHANGE: 0
TREMORS: 0
SINUS PRESSURE: 1
CONSTIPATION: 0
EYES NEGATIVE: 1
SINUS PAIN: 0
VOMITING: 0
SORE THROAT: 1
WHEEZING: 0
ENDOCRINE NEGATIVE: 1
DIZZINESS: 0

## 2022-06-08 ASSESSMENT — PATIENT HEALTH QUESTIONNAIRE - PHQ9
1. LITTLE INTEREST OR PLEASURE IN DOING THINGS: NOT AT ALL
2. FEELING DOWN, DEPRESSED OR HOPELESS: NOT AT ALL
CLINICAL INTERPRETATION OF PHQ2 SCORE: NO FURTHER SCREENING NEEDED
SUM OF ALL RESPONSES TO PHQ9 QUESTIONS 1 AND 2: 0
SUM OF ALL RESPONSES TO PHQ9 QUESTIONS 1 AND 2: 0

## 2022-06-09 LAB
ALBUMIN/CREAT UR: 7 MG/G
CREAT UR-MCNC: 124.7 MG/DL (ref 30–125)
EST. AVERAGE GLUCOSE BLD GHB EST-MCNC: 124 MG/DL (ref 0–154)
HBA1C MFR BLD: 5.9 % (ref 4.2–6)
MICROALBUMIN UR-MCNC: 8.7 MG/L
SARS-COV-2 RNA RESP QL NAA+PROBE: NOT DETECTED
SARS-COV-2 RNA RESP QL NAA+PROBE: NOT DETECTED
SERVICE CMNT-IMP: NORMAL

## 2022-06-29 ENCOUNTER — TELEPHONE (OUTPATIENT)
Dept: INTERNAL MEDICINE | Age: 27
End: 2022-06-29

## 2022-06-30 ENCOUNTER — E-ADVICE (OUTPATIENT)
Dept: FAMILY MEDICINE | Age: 27
End: 2022-06-30

## 2022-08-06 DIAGNOSIS — I10 PRIMARY HYPERTENSION: ICD-10-CM

## 2022-08-08 RX ORDER — AMLODIPINE AND BENAZEPRIL HYDROCHLORIDE 10; 40 MG/1; MG/1
1 CAPSULE ORAL DAILY
Qty: 90 CAPSULE | Refills: 0 | Status: SHIPPED | OUTPATIENT
Start: 2022-08-08 | End: 2022-10-19 | Stop reason: SDUPTHER

## 2022-09-07 RX ORDER — SEMAGLUTIDE 1.34 MG/ML
INJECTION, SOLUTION SUBCUTANEOUS
Qty: 1.5 ML | Refills: 1 | Status: SHIPPED | OUTPATIENT
Start: 2022-09-07 | End: 2022-10-19 | Stop reason: DRUGHIGH

## 2022-09-09 ENCOUNTER — TELEPHONE (OUTPATIENT)
Dept: FAMILY MEDICINE | Age: 27
End: 2022-09-09

## 2022-10-05 ENCOUNTER — TELEPHONE (OUTPATIENT)
Dept: FAMILY MEDICINE | Age: 27
End: 2022-10-05

## 2022-10-19 ENCOUNTER — OFFICE VISIT (OUTPATIENT)
Dept: FAMILY MEDICINE | Age: 27
End: 2022-10-19

## 2022-10-19 ENCOUNTER — LAB SERVICES (OUTPATIENT)
Dept: LAB | Age: 27
End: 2022-10-19

## 2022-10-19 VITALS
SYSTOLIC BLOOD PRESSURE: 110 MMHG | OXYGEN SATURATION: 98 % | HEART RATE: 86 BPM | RESPIRATION RATE: 16 BRPM | WEIGHT: 234.01 LBS | DIASTOLIC BLOOD PRESSURE: 80 MMHG | BODY MASS INDEX: 45.7 KG/M2

## 2022-10-19 DIAGNOSIS — I10 PRIMARY HYPERTENSION: ICD-10-CM

## 2022-10-19 DIAGNOSIS — E66.01 OBESITY, MORBID, BMI 40.0-49.9 (CMD): ICD-10-CM

## 2022-10-19 DIAGNOSIS — E11.9 TYPE 2 DIABETES MELLITUS WITHOUT COMPLICATION, WITHOUT LONG-TERM CURRENT USE OF INSULIN (CMD): ICD-10-CM

## 2022-10-19 DIAGNOSIS — E11.9 TYPE 2 DIABETES MELLITUS WITHOUT COMPLICATION, WITHOUT LONG-TERM CURRENT USE OF INSULIN (CMD): Primary | ICD-10-CM

## 2022-10-19 PROCEDURE — 3074F SYST BP LT 130 MM HG: CPT | Performed by: NURSE PRACTITIONER

## 2022-10-19 PROCEDURE — 83036 HEMOGLOBIN GLYCOSYLATED A1C: CPT | Performed by: NURSE PRACTITIONER

## 2022-10-19 PROCEDURE — 82043 UR ALBUMIN QUANTITATIVE: CPT | Performed by: NURSE PRACTITIONER

## 2022-10-19 PROCEDURE — 36415 COLL VENOUS BLD VENIPUNCTURE: CPT | Performed by: NURSE PRACTITIONER

## 2022-10-19 PROCEDURE — 3079F DIAST BP 80-89 MM HG: CPT | Performed by: NURSE PRACTITIONER

## 2022-10-19 PROCEDURE — 82570 ASSAY OF URINE CREATININE: CPT | Performed by: NURSE PRACTITIONER

## 2022-10-19 PROCEDURE — 99214 OFFICE O/P EST MOD 30 MIN: CPT | Performed by: NURSE PRACTITIONER

## 2022-10-19 RX ORDER — AMLODIPINE AND BENAZEPRIL HYDROCHLORIDE 10; 40 MG/1; MG/1
1 CAPSULE ORAL DAILY
Qty: 90 CAPSULE | Refills: 3 | Status: SHIPPED | OUTPATIENT
Start: 2022-10-19 | End: 2022-11-23 | Stop reason: DRUGHIGH

## 2022-10-19 RX ORDER — SEMAGLUTIDE 1.34 MG/ML
1 INJECTION, SOLUTION SUBCUTANEOUS
Qty: 3 ML | Refills: 3 | Status: SHIPPED | OUTPATIENT
Start: 2022-10-19 | End: 2022-11-16 | Stop reason: DRUGHIGH

## 2022-10-20 LAB
ALBUMIN/CREAT UR: 18 MG/G
CREAT UR-MCNC: 324.7 MG/DL (ref 30–125)
EST. AVERAGE GLUCOSE BLD GHB EST-MCNC: 95 MG/DL (ref 0–154)
HBA1C MFR BLD: 4.9 % (ref 4.2–6)
MICROALBUMIN UR-MCNC: 58.6 MG/L

## 2022-11-09 ENCOUNTER — TELEPHONE (OUTPATIENT)
Dept: INTERNAL MEDICINE | Age: 27
End: 2022-11-09

## 2022-11-23 ENCOUNTER — OFFICE VISIT (OUTPATIENT)
Dept: FAMILY MEDICINE | Age: 27
End: 2022-11-23

## 2022-11-23 VITALS
SYSTOLIC BLOOD PRESSURE: 110 MMHG | HEART RATE: 92 BPM | RESPIRATION RATE: 16 BRPM | OXYGEN SATURATION: 92 % | DIASTOLIC BLOOD PRESSURE: 60 MMHG

## 2022-11-23 DIAGNOSIS — E11.9 TYPE 2 DIABETES MELLITUS WITHOUT COMPLICATION, WITHOUT LONG-TERM CURRENT USE OF INSULIN (CMD): Primary | ICD-10-CM

## 2022-11-23 DIAGNOSIS — I10 PRIMARY HYPERTENSION: ICD-10-CM

## 2022-11-23 DIAGNOSIS — E66.01 OBESITY, MORBID, BMI 40.0-49.9 (CMD): ICD-10-CM

## 2022-11-23 PROCEDURE — 3074F SYST BP LT 130 MM HG: CPT | Performed by: NURSE PRACTITIONER

## 2022-11-23 PROCEDURE — 99214 OFFICE O/P EST MOD 30 MIN: CPT | Performed by: NURSE PRACTITIONER

## 2022-11-23 PROCEDURE — 3078F DIAST BP <80 MM HG: CPT | Performed by: NURSE PRACTITIONER

## 2022-11-23 RX ORDER — AMLODIPINE BESYLATE AND BENAZEPRIL HYDROCHLORIDE 10; 20 MG/1; MG/1
1 CAPSULE ORAL DAILY
Qty: 90 CAPSULE | Refills: 1 | Status: SHIPPED | OUTPATIENT
Start: 2022-11-23 | End: 2023-01-13 | Stop reason: DRUGHIGH

## 2022-11-23 RX ORDER — AMLODIPINE BESYLATE AND BENAZEPRIL HYDROCHLORIDE 10; 20 MG/1; MG/1
1 CAPSULE ORAL DAILY
Qty: 30 CAPSULE | Refills: 1 | Status: SHIPPED | OUTPATIENT
Start: 2022-11-23 | End: 2022-11-23

## 2022-11-23 ASSESSMENT — ENCOUNTER SYMPTOMS
CONSTITUTIONAL NEGATIVE: 1
GASTROINTESTINAL NEGATIVE: 1
NUMBNESS: 0

## 2022-12-06 ENCOUNTER — TELEPHONE (OUTPATIENT)
Dept: OBGYN | Age: 27
End: 2022-12-06

## 2022-12-06 RX ORDER — FLUCONAZOLE 150 MG/1
150 TABLET ORAL ONCE
Qty: 2 TABLET | Refills: 0 | Status: SHIPPED | OUTPATIENT
Start: 2022-12-06 | End: 2022-12-06

## 2022-12-13 ENCOUNTER — TELEPHONE (OUTPATIENT)
Dept: OBGYN | Age: 27
End: 2022-12-13

## 2022-12-14 ENCOUNTER — OFFICE VISIT (OUTPATIENT)
Dept: OBGYN | Age: 27
End: 2022-12-14

## 2022-12-14 ENCOUNTER — LAB SERVICES (OUTPATIENT)
Dept: LAB | Age: 27
End: 2022-12-14

## 2022-12-14 VITALS
WEIGHT: 229 LBS | SYSTOLIC BLOOD PRESSURE: 128 MMHG | HEIGHT: 61 IN | DIASTOLIC BLOOD PRESSURE: 84 MMHG | BODY MASS INDEX: 43.23 KG/M2 | HEART RATE: 100 BPM | RESPIRATION RATE: 16 BRPM

## 2022-12-14 DIAGNOSIS — N76.0 ACUTE VAGINITIS: ICD-10-CM

## 2022-12-14 DIAGNOSIS — Z11.3 SCREEN FOR STD (SEXUALLY TRANSMITTED DISEASE): ICD-10-CM

## 2022-12-14 DIAGNOSIS — N89.8 VAGINAL IRRITATION: Primary | ICD-10-CM

## 2022-12-14 DIAGNOSIS — N89.8 VAGINAL IRRITATION: ICD-10-CM

## 2022-12-14 DIAGNOSIS — N89.8 VAGINAL DISCHARGE: ICD-10-CM

## 2022-12-14 PROCEDURE — 87661 TRICHOMONAS VAGINALIS AMPLIF: CPT | Performed by: INTERNAL MEDICINE

## 2022-12-14 PROCEDURE — 87563 M. GENITALIUM AMP PROBE: CPT | Performed by: INTERNAL MEDICINE

## 2022-12-14 PROCEDURE — 86592 SYPHILIS TEST NON-TREP QUAL: CPT | Performed by: INTERNAL MEDICINE

## 2022-12-14 PROCEDURE — 87340 HEPATITIS B SURFACE AG IA: CPT | Performed by: INTERNAL MEDICINE

## 2022-12-14 PROCEDURE — 36415 COLL VENOUS BLD VENIPUNCTURE: CPT | Performed by: OBSTETRICS & GYNECOLOGY

## 2022-12-14 PROCEDURE — 99213 OFFICE O/P EST LOW 20 MIN: CPT | Performed by: OBSTETRICS & GYNECOLOGY

## 2022-12-14 PROCEDURE — 87591 N.GONORRHOEAE DNA AMP PROB: CPT | Performed by: INTERNAL MEDICINE

## 2022-12-14 PROCEDURE — 81513 NFCT DS BV RNA VAG FLU ALG: CPT | Performed by: INTERNAL MEDICINE

## 2022-12-14 PROCEDURE — 86803 HEPATITIS C AB TEST: CPT | Performed by: INTERNAL MEDICINE

## 2022-12-14 PROCEDURE — 87491 CHLMYD TRACH DNA AMP PROBE: CPT | Performed by: INTERNAL MEDICINE

## 2022-12-14 PROCEDURE — 87389 HIV-1 AG W/HIV-1&-2 AB AG IA: CPT | Performed by: INTERNAL MEDICINE

## 2022-12-14 PROCEDURE — 87481 CANDIDA DNA AMP PROBE: CPT | Performed by: INTERNAL MEDICINE

## 2022-12-14 RX ORDER — METRONIDAZOLE 500 MG/1
500 TABLET ORAL 2 TIMES DAILY
Qty: 14 TABLET | Refills: 0 | Status: SHIPPED | OUTPATIENT
Start: 2022-12-14 | End: 2022-12-21

## 2022-12-15 LAB
C TRACH RRNA SPEC QL NAA+PROBE: NEGATIVE
HBV SURFACE AG SER QL: NEGATIVE
HCV AB SER QL: NEGATIVE
HIV 1+2 AB+HIV1 P24 AG SERPL QL IA: NONREACTIVE
Lab: NORMAL
N GONORRHOEA RRNA SPEC QL NAA+PROBE: NEGATIVE
RPR SER QL: NONREACTIVE

## 2022-12-16 LAB
BACTERIAL VAGINOSIS VAG-IMP: NOT DETECTED
C ALBICANS DNA VAG QL NAA+PROBE: NOT DETECTED
C GLABRATA DNA VAG QL NAA+PROBE: NOT DETECTED
M GENITALIUM DNA SPEC QL NAA+PROBE: NOT DETECTED
SERVICE CMNT-IMP: NORMAL
T VAGINALIS DNA VAG QL NAA+PROBE: NOT DETECTED

## 2022-12-27 ENCOUNTER — BEHAVIORAL HEALTH (OUTPATIENT)
Dept: BEHAVIORAL HEALTH | Age: 27
End: 2022-12-27

## 2022-12-27 DIAGNOSIS — F90.2 ATTENTION DEFICIT HYPERACTIVITY DISORDER (ADHD), COMBINED TYPE: Primary | ICD-10-CM

## 2022-12-27 DIAGNOSIS — E66.9 OBESITY WITH SERIOUS COMORBIDITY, UNSPECIFIED CLASSIFICATION, UNSPECIFIED OBESITY TYPE: ICD-10-CM

## 2022-12-27 PROCEDURE — 90792 PSYCH DIAG EVAL W/MED SRVCS: CPT | Performed by: NURSE PRACTITIONER

## 2023-01-10 ENCOUNTER — E-ADVICE (OUTPATIENT)
Dept: BEHAVIORAL HEALTH | Age: 28
End: 2023-01-10

## 2023-01-13 RX ORDER — AMLODIPINE BESYLATE AND BENAZEPRIL HYDROCHLORIDE 5; 10 MG/1; MG/1
1 CAPSULE ORAL DAILY
Qty: 90 CAPSULE | Refills: 0 | Status: SHIPPED | OUTPATIENT
Start: 2023-01-13 | End: 2023-03-09 | Stop reason: SDUPTHER

## 2023-01-18 ENCOUNTER — BEHAVIORAL HEALTH (OUTPATIENT)
Dept: BEHAVIORAL HEALTH | Age: 28
End: 2023-01-18

## 2023-01-18 DIAGNOSIS — F41.1 GAD (GENERALIZED ANXIETY DISORDER): ICD-10-CM

## 2023-01-18 DIAGNOSIS — E66.9 OBESITY WITH SERIOUS COMORBIDITY, UNSPECIFIED CLASSIFICATION, UNSPECIFIED OBESITY TYPE: ICD-10-CM

## 2023-01-18 DIAGNOSIS — F32.81 PMDD (PREMENSTRUAL DYSPHORIC DISORDER): ICD-10-CM

## 2023-01-18 DIAGNOSIS — F90.2 ATTENTION DEFICIT HYPERACTIVITY DISORDER (ADHD), COMBINED TYPE: Primary | ICD-10-CM

## 2023-01-18 PROCEDURE — 90833 PSYTX W PT W E/M 30 MIN: CPT | Performed by: NURSE PRACTITIONER

## 2023-01-18 PROCEDURE — 99214 OFFICE O/P EST MOD 30 MIN: CPT | Performed by: NURSE PRACTITIONER

## 2023-01-18 RX ORDER — FLUOXETINE 10 MG/1
10 CAPSULE ORAL DAILY
Qty: 90 CAPSULE | Refills: 0 | Status: SHIPPED | OUTPATIENT
Start: 2023-01-18 | End: 2023-02-20 | Stop reason: SINTOL

## 2023-01-20 ENCOUNTER — NURSE ONLY (OUTPATIENT)
Dept: INTERNAL MEDICINE | Age: 28
End: 2023-01-20

## 2023-01-20 ENCOUNTER — OFFICE VISIT (OUTPATIENT)
Dept: FAMILY MEDICINE | Age: 28
End: 2023-01-20

## 2023-01-20 VITALS
HEIGHT: 61 IN | HEART RATE: 91 BPM | TEMPERATURE: 97 F | DIASTOLIC BLOOD PRESSURE: 72 MMHG | OXYGEN SATURATION: 96 % | SYSTOLIC BLOOD PRESSURE: 125 MMHG | WEIGHT: 226.2 LBS | RESPIRATION RATE: 20 BRPM | BODY MASS INDEX: 42.71 KG/M2

## 2023-01-20 DIAGNOSIS — I10 ESSENTIAL HYPERTENSION: Primary | ICD-10-CM

## 2023-01-20 PROCEDURE — 99211 OFF/OP EST MAY X REQ PHY/QHP: CPT

## 2023-01-20 ASSESSMENT — PATIENT HEALTH QUESTIONNAIRE - PHQ9
1. LITTLE INTEREST OR PLEASURE IN DOING THINGS: NOT AT ALL
SUM OF ALL RESPONSES TO PHQ9 QUESTIONS 1 AND 2: 0
CLINICAL INTERPRETATION OF PHQ2 SCORE: NO FURTHER SCREENING NEEDED
2. FEELING DOWN, DEPRESSED OR HOPELESS: NOT AT ALL
SUM OF ALL RESPONSES TO PHQ9 QUESTIONS 1 AND 2: 0

## 2023-02-09 ENCOUNTER — E-ADVICE (OUTPATIENT)
Dept: BEHAVIORAL HEALTH | Age: 28
End: 2023-02-09

## 2023-02-09 RX ORDER — ALPRAZOLAM 0.25 MG/1
0.25 TABLET ORAL NIGHTLY PRN
Qty: 10 TABLET | Refills: 0 | Status: SHIPPED | OUTPATIENT
Start: 2023-02-09 | End: 2023-02-20 | Stop reason: SDUPTHER

## 2023-02-20 ENCOUNTER — E-ADVICE (OUTPATIENT)
Dept: BEHAVIORAL HEALTH | Age: 28
End: 2023-02-20

## 2023-02-20 RX ORDER — ALPRAZOLAM 0.25 MG/1
0.25 TABLET ORAL NIGHTLY PRN
Qty: 10 TABLET | Refills: 0 | Status: SHIPPED | OUTPATIENT
Start: 2023-03-10 | End: 2023-03-17 | Stop reason: SDUPTHER

## 2023-03-09 RX ORDER — AMLODIPINE BESYLATE AND BENAZEPRIL HYDROCHLORIDE 5; 10 MG/1; MG/1
1 CAPSULE ORAL DAILY
Qty: 90 CAPSULE | Refills: 0 | OUTPATIENT
Start: 2023-03-09

## 2023-03-17 ENCOUNTER — BEHAVIORAL HEALTH (OUTPATIENT)
Dept: BEHAVIORAL HEALTH | Age: 28
End: 2023-03-17

## 2023-03-17 DIAGNOSIS — F90.2 ATTENTION DEFICIT HYPERACTIVITY DISORDER (ADHD), COMBINED TYPE: Primary | ICD-10-CM

## 2023-03-17 DIAGNOSIS — E66.9 OBESITY WITH SERIOUS COMORBIDITY, UNSPECIFIED CLASSIFICATION, UNSPECIFIED OBESITY TYPE: ICD-10-CM

## 2023-03-17 DIAGNOSIS — F41.1 GAD (GENERALIZED ANXIETY DISORDER): ICD-10-CM

## 2023-03-17 PROCEDURE — 99214 OFFICE O/P EST MOD 30 MIN: CPT | Performed by: NURSE PRACTITIONER

## 2023-03-17 RX ORDER — ALPRAZOLAM 0.25 MG/1
0.25 TABLET ORAL NIGHTLY PRN
Qty: 10 TABLET | Refills: 0 | Status: SHIPPED | OUTPATIENT
Start: 2023-03-17 | End: 2023-04-28 | Stop reason: SDUPTHER

## 2023-03-17 RX ORDER — GABAPENTIN 100 MG/1
100 CAPSULE ORAL 3 TIMES DAILY PRN
Qty: 90 CAPSULE | Refills: 0 | Status: SHIPPED | OUTPATIENT
Start: 2023-03-17

## 2023-03-17 ASSESSMENT — PATIENT HEALTH QUESTIONNAIRE - PHQ9
5. POOR APPETITE OR OVEREATING: SEVERAL DAYS
1. LITTLE INTEREST OR PLEASURE IN DOING THINGS: SEVERAL DAYS
7. TROUBLE CONCENTRATING ON THINGS, SUCH AS READING THE NEWSPAPER OR WATCHING TELEVISION: SEVERAL DAYS
4. FEELING TIRED OR HAVING LITTLE ENERGY: SEVERAL DAYS
CLINICAL INTERPRETATION OF PHQ9 SCORE: MILD DEPRESSION
2. FEELING DOWN, DEPRESSED OR HOPELESS: NOT AT ALL
CLINICAL INTERPRETATION OF PHQ2 SCORE: NO FURTHER SCREENING NEEDED
8. MOVING OR SPEAKING SO SLOWLY THAT OTHER PEOPLE COULD HAVE NOTICED. OR THE OPPOSITE, BEING SO FIGETY OR RESTLESS THAT YOU HAVE BEEN MOVING AROUND A LOT MORE THAN USUAL: NOT AT ALL
9. THOUGHTS THAT YOU WOULD BE BETTER OFF DEAD, OR OF HURTING YOURSELF: NOT AT ALL
3. TROUBLE FALLING OR STAYING ASLEEP OR SLEEPING TOO MUCH: SEVERAL DAYS
SUM OF ALL RESPONSES TO PHQ QUESTIONS 1-9: 5
6. FEELING BAD ABOUT YOURSELF - OR THAT YOU ARE A FAILURE OR HAVE LET YOURSELF OR YOUR FAMILY DOWN: NOT AT ALL
SUM OF ALL RESPONSES TO PHQ9 QUESTIONS 1 AND 2: 1

## 2023-03-17 ASSESSMENT — ANXIETY QUESTIONNAIRES
2. NOT BEING ABLE TO STOP OR CONTROL WORRYING: 1 - SEVERAL DAYS
3. WORRYING TOO MUCH ABOUT DIFFERENT THINGS: 1 - SEVERAL DAYS
6. BECOMING EASILY ANNOYED OR IRRITABLE: SEVERAL DAYS
4. TROUBLE RELAXING: 1 - SEVERAL DAYS
2. NOT BEING ABLE TO STOP OR CONTROL WORRYING: SEVERAL DAYS
IF YOU CHECKED OFF ANY PROBLEMS ON THIS QUESTIONNAIRE, HOW DIFFICULT HAVE THESE PROBLEMS MADE IT FOR YOU TO DO YOUR WORK, TAKE CARE OF THINGS AT HOME, OR GET ALONG WITH OTHER PEOPLE: SOMEWHAT DIFFICULT
4. TROUBLE RELAXING: SEVERAL DAYS
5. BEING SO RESTLESS THAT IT IS HARD TO SIT STILL: 1 - SEVERAL DAYS
GAD7 TOTAL SCORE: 9
7. FEELING AFRAID AS IF SOMETHING AWFUL MIGHT HAPPEN: 1 - SEVERAL DAYS
6. BECOMING EASILY ANNOYED OR IRRITABLE: 1 - SEVERAL DAYS
5. BEING SO RESTLESS THAT IT IS HARD TO SIT STILL: SEVERAL DAYS
7. FEELING AFRAID AS IF SOMETHING AWFUL MIGHT HAPPEN: SEVERAL DAYS
3. WORRYING TOO MUCH ABOUT DIFFERENT THINGS: SEVERAL DAYS
GAD7 TOTAL SCORE: 9
IF YOU CHECKED OFF ANY PROBLEMS ON THIS QUESTIONNAIRE, HOW DIFFICULT HAVE THESE PROBLEMS MADE IT FOR YOU TO DO YOUR WORK, TAKE CARE OF THINGS AT HOME, OR GET ALONG WITH OTHER PEOPLE: SOMEWHAT DIFFICULT
1. FEELING NERVOUS, ANXIOUS, OR ON EDGE: NEARLY EVERY DAY
1. FEELING NERVOUS, ANXIOUS, OR ON EDGE: 3 - NEARLY EVERY DAY

## 2023-04-12 ENCOUNTER — APPOINTMENT (OUTPATIENT)
Dept: INTERNAL MEDICINE | Age: 28
End: 2023-04-12

## 2023-04-28 ENCOUNTER — E-ADVICE (OUTPATIENT)
Dept: BEHAVIORAL HEALTH | Age: 28
End: 2023-04-28

## 2023-04-28 RX ORDER — ALPRAZOLAM 0.25 MG/1
0.25 TABLET ORAL NIGHTLY PRN
Qty: 10 TABLET | Refills: 0 | Status: SHIPPED | OUTPATIENT
Start: 2023-04-28 | End: 2023-07-02 | Stop reason: SDUPTHER

## 2023-05-19 ENCOUNTER — E-ADVICE (OUTPATIENT)
Dept: BEHAVIORAL HEALTH | Age: 28
End: 2023-05-19

## 2023-05-19 ENCOUNTER — BEHAVIORAL HEALTH (OUTPATIENT)
Dept: BEHAVIORAL HEALTH | Age: 28
End: 2023-05-19

## 2023-05-19 DIAGNOSIS — F32.81 PMDD (PREMENSTRUAL DYSPHORIC DISORDER): ICD-10-CM

## 2023-05-19 DIAGNOSIS — E66.9 OBESITY WITH SERIOUS COMORBIDITY, UNSPECIFIED CLASSIFICATION, UNSPECIFIED OBESITY TYPE: Primary | ICD-10-CM

## 2023-05-19 PROCEDURE — 99214 OFFICE O/P EST MOD 30 MIN: CPT | Performed by: NURSE PRACTITIONER

## 2023-05-19 PROCEDURE — 90833 PSYTX W PT W E/M 30 MIN: CPT | Performed by: NURSE PRACTITIONER

## 2023-05-19 RX ORDER — DEXTROAMPHETAMINE SACCHARATE, AMPHETAMINE ASPARTATE MONOHYDRATE, DEXTROAMPHETAMINE SULFATE AND AMPHETAMINE SULFATE 2.5; 2.5; 2.5; 2.5 MG/1; MG/1; MG/1; MG/1
10 CAPSULE, EXTENDED RELEASE ORAL DAILY
Qty: 30 CAPSULE | Refills: 0 | Status: SHIPPED | OUTPATIENT
Start: 2023-05-19 | End: 2023-07-27 | Stop reason: SDUPTHER

## 2023-05-19 RX ORDER — HYDROCORTISONE ACETATE 25 MG/1
25 SUPPOSITORY RECTAL 2 TIMES DAILY PRN
Qty: 12 EACH | Refills: 1 | Status: SHIPPED | OUTPATIENT
Start: 2023-05-19

## 2023-05-23 RX ORDER — DEXTROAMPHETAMINE SACCHARATE, AMPHETAMINE ASPARTATE MONOHYDRATE, DEXTROAMPHETAMINE SULFATE AND AMPHETAMINE SULFATE 2.5; 2.5; 2.5; 2.5 MG/1; MG/1; MG/1; MG/1
10 CAPSULE, EXTENDED RELEASE ORAL DAILY
Qty: 30 CAPSULE | Refills: 0 | OUTPATIENT
Start: 2023-05-23

## 2023-06-15 ENCOUNTER — OFFICE VISIT (OUTPATIENT)
Dept: OBGYN | Age: 28
End: 2023-06-15

## 2023-06-15 ENCOUNTER — TELEPHONE (OUTPATIENT)
Dept: OBGYN | Age: 28
End: 2023-06-15

## 2023-06-15 VITALS
SYSTOLIC BLOOD PRESSURE: 132 MMHG | DIASTOLIC BLOOD PRESSURE: 76 MMHG | RESPIRATION RATE: 18 BRPM | BODY MASS INDEX: 43.99 KG/M2 | WEIGHT: 233 LBS | HEIGHT: 61 IN

## 2023-06-15 DIAGNOSIS — R10.32 LLQ PAIN: Primary | ICD-10-CM

## 2023-06-15 DIAGNOSIS — R10.2 PELVIC PAIN IN FEMALE: ICD-10-CM

## 2023-06-15 PROCEDURE — 99213 OFFICE O/P EST LOW 20 MIN: CPT | Performed by: OBSTETRICS & GYNECOLOGY

## 2023-06-15 RX ORDER — MEDROXYPROGESTERONE ACETATE 10 MG/1
10 TABLET ORAL DAILY
Qty: 10 TABLET | Refills: 5 | Status: SHIPPED | OUTPATIENT
Start: 2023-06-15 | End: 2023-06-25

## 2023-06-16 ENCOUNTER — E-ADVICE (OUTPATIENT)
Dept: BEHAVIORAL HEALTH | Age: 28
End: 2023-06-16

## 2023-06-30 ENCOUNTER — OFFICE VISIT (OUTPATIENT)
Dept: OBGYN | Age: 28
End: 2023-06-30

## 2023-06-30 ENCOUNTER — IMAGING SERVICES (OUTPATIENT)
Dept: ULTRASOUND IMAGING | Age: 28
End: 2023-06-30
Attending: OBSTETRICS & GYNECOLOGY

## 2023-06-30 VITALS
WEIGHT: 233 LBS | DIASTOLIC BLOOD PRESSURE: 88 MMHG | SYSTOLIC BLOOD PRESSURE: 136 MMHG | HEIGHT: 61 IN | BODY MASS INDEX: 43.99 KG/M2

## 2023-06-30 DIAGNOSIS — E28.2 PCOS (POLYCYSTIC OVARIAN SYNDROME): ICD-10-CM

## 2023-06-30 DIAGNOSIS — R10.2 PELVIC PAIN IN FEMALE: ICD-10-CM

## 2023-06-30 DIAGNOSIS — M62.89 PELVIC FLOOR DYSFUNCTION: Primary | ICD-10-CM

## 2023-06-30 PROCEDURE — 76856 US EXAM PELVIC COMPLETE: CPT | Performed by: RADIOLOGY

## 2023-06-30 PROCEDURE — 99214 OFFICE O/P EST MOD 30 MIN: CPT | Performed by: STUDENT IN AN ORGANIZED HEALTH CARE EDUCATION/TRAINING PROGRAM

## 2023-06-30 PROCEDURE — 76830 TRANSVAGINAL US NON-OB: CPT | Performed by: RADIOLOGY

## 2023-07-01 PROBLEM — M62.89 PELVIC FLOOR DYSFUNCTION: Status: ACTIVE | Noted: 2023-07-01

## 2023-07-01 PROBLEM — E28.2 PCOS (POLYCYSTIC OVARIAN SYNDROME): Status: ACTIVE | Noted: 2023-07-01

## 2023-07-05 RX ORDER — ALPRAZOLAM 0.25 MG/1
0.25 TABLET ORAL NIGHTLY PRN
Qty: 10 TABLET | Refills: 0 | Status: SHIPPED | OUTPATIENT
Start: 2023-07-05

## 2023-07-17 ENCOUNTER — HOSPITAL ENCOUNTER (OUTPATIENT)
Dept: REHABILITATION | Age: 28
Discharge: STILL ADMITTED OR ADMINISTRATIVE | End: 2023-07-17

## 2023-07-17 PROCEDURE — 97161 PT EVAL LOW COMPLEX 20 MIN: CPT

## 2023-07-17 PROCEDURE — 97110 THERAPEUTIC EXERCISES: CPT

## 2023-07-17 ASSESSMENT — ENCOUNTER SYMPTOMS
PAIN SEVERITY NOW: 0
PAIN LOCATION: LOWER ABDOMEN
SUBJECTIVE PAIN PROGRESSION: IMPROVED
PAIN SCALE AT LOWEST: 0
PAIN FREQUENCY: INTERMITTENT
QUALITY: CRAMPING
ALLEVIATING FACTORS: UNABLE TO STATE ANYTHING THAT HELPS REDUCE THE PAIN
PAIN SCALE AT HIGHEST: 10

## 2023-07-18 ENCOUNTER — TELEPHONE (OUTPATIENT)
Dept: OBGYN | Age: 28
End: 2023-07-18

## 2023-07-19 ENCOUNTER — TELEPHONE (OUTPATIENT)
Dept: OBGYN | Age: 28
End: 2023-07-19

## 2023-07-19 DIAGNOSIS — N76.0 ACUTE VAGINITIS: ICD-10-CM

## 2023-07-19 DIAGNOSIS — Z11.3 SCREEN FOR STD (SEXUALLY TRANSMITTED DISEASE): ICD-10-CM

## 2023-07-19 DIAGNOSIS — N89.8 VAGINAL IRRITATION: ICD-10-CM

## 2023-07-19 RX ORDER — FLUCONAZOLE 150 MG/1
TABLET ORAL
Qty: 2 TABLET | Refills: 0 | Status: SHIPPED | OUTPATIENT
Start: 2023-07-19

## 2023-07-27 ENCOUNTER — BEHAVIORAL HEALTH (OUTPATIENT)
Dept: BEHAVIORAL HEALTH | Age: 28
End: 2023-07-27

## 2023-07-27 DIAGNOSIS — F41.1 GAD (GENERALIZED ANXIETY DISORDER): ICD-10-CM

## 2023-07-27 DIAGNOSIS — F90.2 ATTENTION DEFICIT HYPERACTIVITY DISORDER (ADHD), COMBINED TYPE: ICD-10-CM

## 2023-07-27 DIAGNOSIS — F32.81 PMDD (PREMENSTRUAL DYSPHORIC DISORDER): Primary | ICD-10-CM

## 2023-07-27 PROCEDURE — 90833 PSYTX W PT W E/M 30 MIN: CPT | Performed by: NURSE PRACTITIONER

## 2023-07-27 PROCEDURE — 99214 OFFICE O/P EST MOD 30 MIN: CPT | Performed by: NURSE PRACTITIONER

## 2023-07-27 RX ORDER — DEXTROAMPHETAMINE SACCHARATE, AMPHETAMINE ASPARTATE MONOHYDRATE, DEXTROAMPHETAMINE SULFATE AND AMPHETAMINE SULFATE 2.5; 2.5; 2.5; 2.5 MG/1; MG/1; MG/1; MG/1
10 CAPSULE, EXTENDED RELEASE ORAL DAILY
Qty: 30 CAPSULE | Refills: 0 | Status: SHIPPED | OUTPATIENT
Start: 2023-07-27 | End: 2023-07-27 | Stop reason: SDUPTHER

## 2023-07-27 RX ORDER — DEXTROAMPHETAMINE SACCHARATE, AMPHETAMINE ASPARTATE MONOHYDRATE, DEXTROAMPHETAMINE SULFATE AND AMPHETAMINE SULFATE 2.5; 2.5; 2.5; 2.5 MG/1; MG/1; MG/1; MG/1
10 CAPSULE, EXTENDED RELEASE ORAL DAILY
Qty: 30 CAPSULE | Refills: 0 | Status: SHIPPED | OUTPATIENT
Start: 2023-07-27

## 2023-08-02 ENCOUNTER — E-ADVICE (OUTPATIENT)
Dept: OBGYN | Age: 28
End: 2023-08-02

## 2023-08-31 ENCOUNTER — TELEPHONE (OUTPATIENT)
Dept: ULTRASOUND IMAGING | Age: 28
End: 2023-08-31

## 2023-09-08 ENCOUNTER — E-ADVICE (OUTPATIENT)
Dept: ULTRASOUND IMAGING | Age: 28
End: 2023-09-08

## 2023-09-21 ENCOUNTER — E-ADVICE (OUTPATIENT)
Dept: ULTRASOUND IMAGING | Age: 28
End: 2023-09-21

## 2023-09-28 ENCOUNTER — APPOINTMENT (OUTPATIENT)
Dept: OBGYN | Age: 28
End: 2023-09-28

## 2023-10-16 ENCOUNTER — TELEPHONE (OUTPATIENT)
Dept: OBGYN | Age: 28
End: 2023-10-16

## 2023-10-16 DIAGNOSIS — R10.31 BILATERAL LOWER ABDOMINAL CRAMPING: Primary | ICD-10-CM

## 2023-10-16 DIAGNOSIS — K59.00 CONSTIPATION, UNSPECIFIED CONSTIPATION TYPE: ICD-10-CM

## 2023-10-16 DIAGNOSIS — R10.32 BILATERAL LOWER ABDOMINAL CRAMPING: Primary | ICD-10-CM

## 2023-10-19 ENCOUNTER — IMAGING SERVICES (OUTPATIENT)
Dept: ULTRASOUND IMAGING | Age: 28
End: 2023-10-19
Attending: STUDENT IN AN ORGANIZED HEALTH CARE EDUCATION/TRAINING PROGRAM

## 2023-10-19 DIAGNOSIS — R10.31 BILATERAL LOWER ABDOMINAL CRAMPING: ICD-10-CM

## 2023-10-19 DIAGNOSIS — R10.32 BILATERAL LOWER ABDOMINAL CRAMPING: ICD-10-CM

## 2023-10-19 PROCEDURE — 76856 US EXAM PELVIC COMPLETE: CPT | Performed by: RADIOLOGY

## 2023-10-19 PROCEDURE — 76830 TRANSVAGINAL US NON-OB: CPT | Performed by: RADIOLOGY

## 2023-10-30 ENCOUNTER — E-ADVICE (OUTPATIENT)
Dept: BEHAVIORAL HEALTH | Age: 28
End: 2023-10-30

## 2023-10-30 RX ORDER — ALPRAZOLAM 0.25 MG/1
0.25 TABLET ORAL NIGHTLY PRN
Qty: 10 TABLET | Refills: 0 | OUTPATIENT
Start: 2023-10-30

## 2023-12-15 ENCOUNTER — APPOINTMENT (OUTPATIENT)
Dept: OBGYN | Age: 28
End: 2023-12-15

## 2023-12-15 VITALS
SYSTOLIC BLOOD PRESSURE: 120 MMHG | BODY MASS INDEX: 46.07 KG/M2 | DIASTOLIC BLOOD PRESSURE: 88 MMHG | HEIGHT: 61 IN | WEIGHT: 244 LBS

## 2023-12-15 DIAGNOSIS — R10.2 PELVIC PAIN IN FEMALE: Primary | ICD-10-CM

## 2023-12-15 PROCEDURE — 99213 OFFICE O/P EST LOW 20 MIN: CPT | Performed by: STUDENT IN AN ORGANIZED HEALTH CARE EDUCATION/TRAINING PROGRAM

## 2023-12-15 RX ORDER — FLUTICASONE PROPIONATE 50 MCG
1 SPRAY, SUSPENSION (ML) NASAL DAILY
COMMUNITY
Start: 2023-10-03

## 2024-01-16 ENCOUNTER — APPOINTMENT (OUTPATIENT)
Dept: GASTROENTEROLOGY | Age: 29
End: 2024-01-16

## 2024-01-22 ENCOUNTER — E-ADVICE (OUTPATIENT)
Dept: OBGYN | Age: 29
End: 2024-01-22

## 2024-04-06 ENCOUNTER — MOBILE (OUTPATIENT)
Dept: OBGYN | Age: 29
End: 2024-04-06

## 2024-04-06 RX ORDER — HYDROCORTISONE ACETATE 25 MG/1
25 SUPPOSITORY RECTAL 2 TIMES DAILY PRN
Qty: 14 EACH | Refills: 0 | Status: SHIPPED | OUTPATIENT
Start: 2024-04-06 | End: 2024-04-13

## 2024-05-09 ENCOUNTER — APPOINTMENT (OUTPATIENT)
Dept: DERMATOLOGY | Age: 29
End: 2024-05-09

## 2024-05-09 DIAGNOSIS — L02.91 CUTANEOUS ABSCESS, UNSPECIFIED SITE: ICD-10-CM

## 2024-05-09 DIAGNOSIS — D22.39 FIBROUS PAPULE OF NOSE: Primary | ICD-10-CM

## 2024-05-09 RX ORDER — CEFUROXIME AXETIL 500 MG/1
TABLET ORAL
Qty: 60 TABLET | Refills: 5 | Status: SHIPPED | OUTPATIENT
Start: 2024-05-09

## 2024-05-14 ENCOUNTER — TELEPHONE (OUTPATIENT)
Dept: DERMATOLOGY | Age: 29
End: 2024-05-14

## 2024-05-14 ENCOUNTER — E-ADVICE (OUTPATIENT)
Dept: DERMATOLOGY | Age: 29
End: 2024-05-14

## 2024-05-14 RX ORDER — DOXYCYCLINE HYCLATE 100 MG
TABLET ORAL
Qty: 28 TABLET | Refills: 0 | Status: SHIPPED | OUTPATIENT
Start: 2024-05-14 | End: 2024-06-10 | Stop reason: SDUPTHER

## 2024-06-10 ENCOUNTER — APPOINTMENT (OUTPATIENT)
Dept: DERMATOLOGY | Age: 29
End: 2024-06-10

## 2024-06-10 DIAGNOSIS — D48.9 NEOPLASM OF UNCERTAIN BEHAVIOR: Primary | ICD-10-CM

## 2024-06-10 RX ORDER — DOXYCYCLINE HYCLATE 100 MG
TABLET ORAL
Qty: 20 TABLET | Refills: 0 | Status: SHIPPED | OUTPATIENT
Start: 2024-06-10

## 2024-06-13 LAB
ASR DISCLAIMER: NORMAL
CASE RPRT: NORMAL
CLINICAL INFO: NORMAL
PATH REPORT.FINAL DX SPEC: NORMAL
PATH REPORT.GROSS SPEC: NORMAL

## 2024-06-14 ENCOUNTER — TELEPHONE (OUTPATIENT)
Dept: DERMATOLOGY | Age: 29
End: 2024-06-14

## 2024-06-19 ENCOUNTER — TELEPHONE (OUTPATIENT)
Dept: DERMATOLOGY | Age: 29
End: 2024-06-19

## 2024-06-19 ENCOUNTER — NURSE ONLY (OUTPATIENT)
Dept: DERMATOLOGY | Age: 29
End: 2024-06-19

## 2024-06-19 DIAGNOSIS — D48.9 NEOPLASM OF UNCERTAIN BEHAVIOR: Primary | ICD-10-CM

## 2024-06-19 PROCEDURE — X1094 NO CHARGE VISIT: HCPCS | Performed by: PHYSICIAN ASSISTANT

## 2024-06-20 ENCOUNTER — APPOINTMENT (OUTPATIENT)
Dept: DERMATOLOGY | Age: 29
End: 2024-06-20

## 2024-08-20 ENCOUNTER — TELEPHONE (OUTPATIENT)
Dept: DERMATOLOGY | Age: 29
End: 2024-08-20

## 2024-09-19 DIAGNOSIS — Z11.3 SCREEN FOR STD (SEXUALLY TRANSMITTED DISEASE): ICD-10-CM

## 2024-09-19 DIAGNOSIS — N76.0 ACUTE VAGINITIS: ICD-10-CM

## 2024-09-19 DIAGNOSIS — N89.8 VAGINAL IRRITATION: ICD-10-CM

## 2024-09-19 RX ORDER — FLUCONAZOLE 150 MG/1
TABLET ORAL
Qty: 2 TABLET | Refills: 0 | OUTPATIENT
Start: 2024-09-19

## 2024-09-19 RX ORDER — TERCONAZOLE 0.4 %
CREAM WITH APPLICATOR VAGINAL
Qty: 45 G | Refills: 2 | OUTPATIENT
Start: 2024-09-19

## 2024-09-20 ENCOUNTER — TELEPHONE (OUTPATIENT)
Dept: OBGYN | Age: 29
End: 2024-09-20

## 2024-09-20 RX ORDER — FLUCONAZOLE 150 MG/1
150 TABLET ORAL ONCE
Qty: 2 TABLET | Refills: 0 | Status: SHIPPED | OUTPATIENT
Start: 2024-09-20 | End: 2024-09-20

## 2024-10-03 ENCOUNTER — OFFICE VISIT (OUTPATIENT)
Dept: OBGYN | Age: 29
End: 2024-10-03

## 2024-10-03 ENCOUNTER — LAB SERVICES (OUTPATIENT)
Dept: LAB | Age: 29
End: 2024-10-03

## 2024-10-03 VITALS
BODY MASS INDEX: 41.54 KG/M2 | WEIGHT: 220 LBS | RESPIRATION RATE: 18 BRPM | DIASTOLIC BLOOD PRESSURE: 82 MMHG | SYSTOLIC BLOOD PRESSURE: 128 MMHG | HEIGHT: 61 IN

## 2024-10-03 DIAGNOSIS — N89.8 VAGINAL IRRITATION: ICD-10-CM

## 2024-10-03 DIAGNOSIS — N89.8 VAGINAL DISCHARGE: Primary | ICD-10-CM

## 2024-10-03 DIAGNOSIS — Z00.00 ROUTINE GENERAL MEDICAL EXAMINATION AT A HEALTH CARE FACILITY: ICD-10-CM

## 2024-10-03 DIAGNOSIS — Z11.3 ROUTINE SCREENING FOR STI (SEXUALLY TRANSMITTED INFECTION): ICD-10-CM

## 2024-10-03 DIAGNOSIS — Z11.8 SCREENING FOR CHLAMYDIAL DISEASE: ICD-10-CM

## 2024-10-03 LAB
BASOPHILS # BLD: 0 K/MCL (ref 0–0.3)
BASOPHILS NFR BLD: 1 %
CHOLEST SERPL-MCNC: 208 MG/DL
CHOLEST/HDLC SERPL: 5.9 {RATIO}
DEPRECATED RDW RBC: 38.3 FL (ref 39–50)
EOSINOPHIL # BLD: 0 K/MCL (ref 0–0.5)
EOSINOPHIL NFR BLD: 1 %
ERYTHROCYTE [DISTWIDTH] IN BLOOD: 12.2 % (ref 11–15)
HBA1C MFR BLD: 11.4 % (ref 4.5–5.6)
HCT VFR BLD CALC: 43.7 % (ref 36–46.5)
HDLC SERPL-MCNC: 35 MG/DL
HGB BLD-MCNC: 15.3 G/DL (ref 12–15.5)
IMM GRANULOCYTES # BLD AUTO: 0 K/MCL (ref 0–0.2)
IMM GRANULOCYTES # BLD: 0 %
LDLC SERPL CALC-MCNC: 130 MG/DL
LYMPHOCYTES # BLD: 2.1 K/MCL (ref 1–4.8)
LYMPHOCYTES NFR BLD: 33 %
MCH RBC QN AUTO: 30.2 PG (ref 26–34)
MCHC RBC AUTO-ENTMCNC: 35 G/DL (ref 32–36.5)
MCV RBC AUTO: 86.2 FL (ref 78–100)
MONOCYTES # BLD: 0.3 K/MCL (ref 0.3–0.9)
MONOCYTES NFR BLD: 5 %
NEUTROPHILS # BLD: 4 K/MCL (ref 1.8–7.7)
NEUTROPHILS NFR BLD: 60 %
NONHDLC SERPL-MCNC: 173 MG/DL
NRBC BLD MANUAL-RTO: 0 /100 WBC
PLATELET # BLD AUTO: 355 K/MCL (ref 140–450)
RBC # BLD: 5.07 MIL/MCL (ref 4–5.2)
TRIGL SERPL-MCNC: 214 MG/DL
TSH SERPL-ACNC: 1.13 MCUNITS/ML (ref 0.35–5)
WBC # BLD: 6.5 K/MCL (ref 4.2–11)

## 2024-10-03 PROCEDURE — 80061 LIPID PANEL: CPT | Performed by: INTERNAL MEDICINE

## 2024-10-03 PROCEDURE — 83036 HEMOGLOBIN GLYCOSYLATED A1C: CPT | Performed by: INTERNAL MEDICINE

## 2024-10-03 PROCEDURE — 36415 COLL VENOUS BLD VENIPUNCTURE: CPT | Performed by: NURSE PRACTITIONER

## 2024-10-03 PROCEDURE — 80050 GENERAL HEALTH PANEL: CPT | Performed by: INTERNAL MEDICINE

## 2024-10-03 RX ORDER — TERCONAZOLE 8 MG/G
1 CREAM VAGINAL NIGHTLY
Qty: 1 EACH | Refills: 1 | Status: SHIPPED | OUTPATIENT
Start: 2024-10-03 | End: 2024-10-06

## 2024-10-04 LAB
ALBUMIN SERPL-MCNC: 3.7 G/DL (ref 3.4–5)
ALBUMIN/GLOB SERPL: 1 {RATIO} (ref 1–2.4)
ALP SERPL-CCNC: 155 UNITS/L (ref 45–117)
ALT SERPL-CCNC: 20 UNITS/L
ANION GAP SERPL CALC-SCNC: 17 MMOL/L (ref 7–19)
AST SERPL-CCNC: 18 UNITS/L
BACTERIAL VAGINOSIS VAG-IMP: NOT DETECTED
BILIRUB SERPL-MCNC: 0.9 MG/DL (ref 0.2–1)
BUN SERPL-MCNC: 14 MG/DL (ref 6–20)
BUN/CREAT SERPL: 22 (ref 7–25)
C ALBICANS DNA VAG QL NAA+PROBE: POSITIVE
C GLABRATA DNA VAG QL NAA+PROBE: NOT DETECTED
CALCIUM SERPL-MCNC: 8.8 MG/DL (ref 8.4–10.2)
CHLORIDE SERPL-SCNC: 101 MMOL/L (ref 97–110)
CO2 SERPL-SCNC: 23 MMOL/L (ref 21–32)
CREAT SERPL-MCNC: 0.64 MG/DL (ref 0.51–0.95)
EGFRCR SERPLBLD CKD-EPI 2021: >90 ML/MIN/{1.73_M2}
FASTING DURATION TIME PATIENT: 12 HOURS (ref 0–999)
GLOBULIN SER-MCNC: 3.7 G/DL (ref 2–4)
GLUCOSE SERPL-MCNC: 290 MG/DL (ref 70–99)
POTASSIUM SERPL-SCNC: 3.9 MMOL/L (ref 3.4–5.1)
PROT SERPL-MCNC: 7.4 G/DL (ref 6.4–8.2)
SERVICE CMNT-IMP: ABNORMAL
SERVICE CMNT-IMP: NORMAL
SODIUM SERPL-SCNC: 137 MMOL/L (ref 135–145)
T VAGINALIS DNA VAG QL NAA+PROBE: NOT DETECTED

## 2024-10-05 ENCOUNTER — TELEPHONE (OUTPATIENT)
Dept: OBGYN | Age: 29
End: 2024-10-05

## 2024-10-05 DIAGNOSIS — B37.9 YEAST INFECTION: Primary | ICD-10-CM

## 2024-10-05 LAB
C TRACH RRNA CVX QL NAA+PROBE: NEGATIVE
Lab: NORMAL
N GONORRHOEA RRNA CVX QL NAA+PROBE: NEGATIVE

## 2024-10-05 RX ORDER — FLUCONAZOLE 150 MG/1
150 TABLET ORAL
Qty: 2 TABLET | Refills: 0 | Status: SHIPPED | OUTPATIENT
Start: 2024-10-05 | End: 2024-10-09

## 2024-10-20 SDOH — ECONOMIC STABILITY: HOUSING INSECURITY: DO YOU HAVE PROBLEMS WITH ANY OF THE FOLLOWING?: PESTS SUCH AS BUGS, ANTS, OR MICE

## 2024-10-20 SDOH — ECONOMIC STABILITY: TRANSPORTATION INSECURITY
IN THE PAST 12 MONTHS, HAS LACK OF RELIABLE TRANSPORTATION KEPT YOU FROM MEDICAL APPOINTMENTS, MEETINGS, WORK OR FROM GETTING THINGS NEEDED FOR DAILY LIVING?: NO

## 2024-10-20 SDOH — ECONOMIC STABILITY: FOOD INSECURITY: WITHIN THE PAST 12 MONTHS, THE FOOD YOU BOUGHT JUST DIDN'T LAST AND YOU DIDN'T HAVE MONEY TO GET MORE.: NEVER TRUE

## 2024-10-20 SDOH — ECONOMIC STABILITY: GENERAL: WOULD YOU LIKE HELP WITH ANY OF THE FOLLOWING NEEDS?: I DON'T WANT HELP WITH ANY OF THESE

## 2024-10-20 SDOH — ECONOMIC STABILITY: HOUSING INSECURITY: WHAT IS YOUR LIVING SITUATION TODAY?: I HAVE A STEADY PLACE TO LIVE

## 2024-10-20 ASSESSMENT — SOCIAL DETERMINANTS OF HEALTH (SDOH): IN THE PAST 12 MONTHS, HAS THE ELECTRIC, GAS, OIL, OR WATER COMPANY THREATENED TO SHUT OFF SERVICE IN YOUR HOME?: NO

## 2024-10-22 ENCOUNTER — TELEPHONE (OUTPATIENT)
Dept: FAMILY MEDICINE | Age: 29
End: 2024-10-22

## 2024-10-22 ENCOUNTER — APPOINTMENT (OUTPATIENT)
Dept: FAMILY MEDICINE | Age: 29
End: 2024-10-22

## 2024-10-22 VITALS
SYSTOLIC BLOOD PRESSURE: 122 MMHG | BODY MASS INDEX: 42.67 KG/M2 | HEART RATE: 91 BPM | RESPIRATION RATE: 18 BRPM | HEIGHT: 61 IN | TEMPERATURE: 97.1 F | OXYGEN SATURATION: 98 % | DIASTOLIC BLOOD PRESSURE: 68 MMHG | WEIGHT: 226 LBS

## 2024-10-22 DIAGNOSIS — E11.65 TYPE 2 DIABETES MELLITUS WITH HYPERGLYCEMIA, WITHOUT LONG-TERM CURRENT USE OF INSULIN (CMD): ICD-10-CM

## 2024-10-22 DIAGNOSIS — Z00.00 ANNUAL PHYSICAL EXAM: Primary | ICD-10-CM

## 2024-10-22 DIAGNOSIS — E78.2 MIXED HYPERLIPIDEMIA: ICD-10-CM

## 2024-10-22 DIAGNOSIS — E28.2 PCOS (POLYCYSTIC OVARIAN SYNDROME): ICD-10-CM

## 2024-10-22 DIAGNOSIS — R10.2 PELVIC PAIN IN FEMALE: ICD-10-CM

## 2024-10-22 DIAGNOSIS — L72.0 EIC (EPIDERMAL INCLUSION CYST): ICD-10-CM

## 2024-10-22 DIAGNOSIS — Z86.32 HISTORY OF GESTATIONAL DIABETES: ICD-10-CM

## 2024-10-22 DIAGNOSIS — E66.01 OBESITY, MORBID, BMI 40.0-49.9  (CMD): ICD-10-CM

## 2024-10-22 DIAGNOSIS — Z30.49 ENCOUNTER FOR SURVEILLANCE OF OTHER CONTRACEPTIVE: ICD-10-CM

## 2024-10-22 DIAGNOSIS — F41.9 ANXIETY DISORDER, UNSPECIFIED TYPE: ICD-10-CM

## 2024-10-22 DIAGNOSIS — Z23 IMMUNIZATION DUE: ICD-10-CM

## 2024-10-22 DIAGNOSIS — Z12.4 CERVICAL CANCER SCREENING: ICD-10-CM

## 2024-10-22 DIAGNOSIS — Z86.19 HISTORY OF CANDIDAL VULVOVAGINITIS: ICD-10-CM

## 2024-10-22 DIAGNOSIS — M62.89 PELVIC FLOOR DYSFUNCTION: ICD-10-CM

## 2024-10-22 DIAGNOSIS — B37.31 VAGINAL YEAST INFECTION: ICD-10-CM

## 2024-10-22 DIAGNOSIS — I15.2 HYPERTENSION DUE TO ENDOCRINE DISORDER: ICD-10-CM

## 2024-10-22 DIAGNOSIS — R74.8 ELEVATED ALKALINE PHOSPHATASE LEVEL: ICD-10-CM

## 2024-10-22 PROBLEM — Z30.41 ENCOUNTER FOR SURVEILLANCE OF CONTRACEPTIVE PILLS: Status: ACTIVE | Noted: 2024-10-22

## 2024-10-22 PROBLEM — I10 PRIMARY HYPERTENSION: Status: ACTIVE | Noted: 2024-10-22

## 2024-10-22 PROBLEM — L98.9 SKIN LESION: Status: ACTIVE | Noted: 2024-10-22

## 2024-10-22 PROBLEM — E11.9 TYPE 2 DIABETES MELLITUS WITHOUT COMPLICATION, WITHOUT LONG-TERM CURRENT USE OF INSULIN  (CMD): Status: ACTIVE | Noted: 2024-10-22

## 2024-10-22 PROBLEM — L98.9 SKIN LESION: Status: RESOLVED | Noted: 2024-10-22 | Resolved: 2024-10-22

## 2024-10-22 PROCEDURE — 99214 OFFICE O/P EST MOD 30 MIN: CPT

## 2024-10-22 PROCEDURE — 90472 IMMUNIZATION ADMIN EACH ADD: CPT

## 2024-10-22 PROCEDURE — 3078F DIAST BP <80 MM HG: CPT

## 2024-10-22 PROCEDURE — 90656 IIV3 VACC NO PRSV 0.5 ML IM: CPT

## 2024-10-22 PROCEDURE — 3074F SYST BP LT 130 MM HG: CPT

## 2024-10-22 PROCEDURE — 90471 IMMUNIZATION ADMIN: CPT

## 2024-10-22 PROCEDURE — 99395 PREV VISIT EST AGE 18-39: CPT

## 2024-10-22 PROCEDURE — 90677 PCV20 VACCINE IM: CPT

## 2024-10-22 PROCEDURE — 90715 TDAP VACCINE 7 YRS/> IM: CPT

## 2024-10-22 RX ORDER — AMLODIPINE AND BENAZEPRIL HYDROCHLORIDE 5; 10 MG/1; MG/1
1 CAPSULE ORAL DAILY
Qty: 90 CAPSULE | Refills: 0 | Status: SHIPPED | OUTPATIENT
Start: 2024-10-22 | End: 2024-10-22 | Stop reason: ALTCHOICE

## 2024-10-22 RX ORDER — NAPROXEN 250 MG/1
250 TABLET ORAL 2 TIMES DAILY PRN
Qty: 60 TABLET | Refills: 0 | Status: SHIPPED | OUTPATIENT
Start: 2024-10-22

## 2024-10-22 RX ORDER — ALPRAZOLAM 0.25 MG/1
0.25 TABLET ORAL NIGHTLY PRN
Qty: 10 TABLET | Refills: 0 | Status: SHIPPED | OUTPATIENT
Start: 2024-10-22

## 2024-10-22 RX ORDER — GLIMEPIRIDE 2 MG/1
2 TABLET ORAL
COMMUNITY
Start: 2024-10-05 | End: 2024-10-22 | Stop reason: ALTCHOICE

## 2024-10-22 RX ORDER — TIRZEPATIDE 5 MG/.5ML
5 INJECTION, SOLUTION SUBCUTANEOUS
Qty: 2 ML | Refills: 3 | Status: SHIPPED | OUTPATIENT
Start: 2024-11-19

## 2024-10-22 RX ORDER — TIRZEPATIDE 2.5 MG/.5ML
2.5 INJECTION, SOLUTION SUBCUTANEOUS
Qty: 2 ML | Refills: 0 | Status: SHIPPED | OUTPATIENT
Start: 2024-10-22 | End: 2024-11-19

## 2024-10-22 RX ORDER — LOSARTAN POTASSIUM 50 MG/1
50 TABLET ORAL DAILY
Qty: 90 TABLET | Refills: 1 | Status: SHIPPED | OUTPATIENT
Start: 2024-10-22

## 2024-10-22 ASSESSMENT — PATIENT HEALTH QUESTIONNAIRE - PHQ9
1. LITTLE INTEREST OR PLEASURE IN DOING THINGS: NOT AT ALL
SUM OF ALL RESPONSES TO PHQ9 QUESTIONS 1 AND 2: 0
SUM OF ALL RESPONSES TO PHQ9 QUESTIONS 1 AND 2: 0
2. FEELING DOWN, DEPRESSED OR HOPELESS: NOT AT ALL
CLINICAL INTERPRETATION OF PHQ2 SCORE: NO FURTHER SCREENING NEEDED

## 2024-10-22 ASSESSMENT — ENCOUNTER SYMPTOMS
LIGHT-HEADEDNESS: 0
NAUSEA: 0
FATIGUE: 0
TROUBLE SWALLOWING: 0
SHORTNESS OF BREATH: 0
CONSTIPATION: 0
WEAKNESS: 0
NUMBNESS: 0
CHEST TIGHTNESS: 0
VOMITING: 0
SORE THROAT: 1
HEADACHES: 0
DIZZINESS: 0
RHINORRHEA: 0
DIARRHEA: 0
ABDOMINAL PAIN: 0
TREMORS: 0
FEVER: 0
CHILLS: 0
COUGH: 0

## 2024-10-25 ENCOUNTER — APPOINTMENT (OUTPATIENT)
Dept: OBGYN | Age: 29
End: 2024-10-25

## 2024-10-27 ENCOUNTER — E-ADVICE (OUTPATIENT)
Dept: FAMILY MEDICINE | Age: 29
End: 2024-10-27

## 2024-10-27 DIAGNOSIS — R11.0 NAUSEA: Primary | ICD-10-CM

## 2024-10-29 RX ORDER — ONDANSETRON 4 MG/1
4 TABLET, FILM COATED ORAL EVERY 8 HOURS PRN
Qty: 20 TABLET | Refills: 0 | Status: SHIPPED | OUTPATIENT
Start: 2024-10-29

## 2024-11-27 ENCOUNTER — APPOINTMENT (OUTPATIENT)
Dept: DERMATOLOGY | Age: 29
End: 2024-11-27

## 2024-11-27 DIAGNOSIS — L98.9 DERMATOSIS: ICD-10-CM

## 2024-11-27 DIAGNOSIS — L71.9 ACNE ROSACEA: Primary | ICD-10-CM

## 2024-11-27 PROCEDURE — 99214 OFFICE O/P EST MOD 30 MIN: CPT | Performed by: PHYSICIAN ASSISTANT

## 2024-11-27 RX ORDER — TRIAMCINOLONE ACETONIDE 1 MG/G
CREAM TOPICAL
Qty: 454 G | Refills: 1 | Status: SHIPPED | OUTPATIENT
Start: 2024-11-27

## 2025-01-16 DIAGNOSIS — F41.9 ANXIETY DISORDER, UNSPECIFIED TYPE: ICD-10-CM

## 2025-01-16 RX ORDER — ALPRAZOLAM 0.25 MG/1
0.25 TABLET ORAL NIGHTLY PRN
Qty: 10 TABLET | Refills: 0 | Status: SHIPPED | OUTPATIENT
Start: 2025-01-16

## 2025-01-20 ENCOUNTER — APPOINTMENT (OUTPATIENT)
Age: 30
End: 2025-01-20

## 2025-01-20 DIAGNOSIS — F33.1 MAJOR DEPRESSIVE DISORDER, RECURRENT EPISODE, MODERATE (CMD): ICD-10-CM

## 2025-01-20 DIAGNOSIS — F41.1 GAD (GENERALIZED ANXIETY DISORDER): Primary | ICD-10-CM

## 2025-01-20 PROCEDURE — 90792 PSYCH DIAG EVAL W/MED SRVCS: CPT | Performed by: STUDENT IN AN ORGANIZED HEALTH CARE EDUCATION/TRAINING PROGRAM

## 2025-01-20 RX ORDER — HYDROXYZINE HYDROCHLORIDE 10 MG/1
10 TABLET, FILM COATED ORAL DAILY PRN
Qty: 30 TABLET | Refills: 0 | Status: SHIPPED | OUTPATIENT
Start: 2025-01-20

## 2025-01-20 ASSESSMENT — ENCOUNTER SYMPTOMS
HALLUCINATIONS: 0
DELUSIONS: 0
DECREASED NEED FOR SLEEP: 0
PARANOIA: 0

## 2025-01-23 ENCOUNTER — TELEPHONE (OUTPATIENT)
Dept: FAMILY MEDICINE | Age: 30
End: 2025-01-23

## 2025-01-27 ENCOUNTER — APPOINTMENT (OUTPATIENT)
Dept: FAMILY MEDICINE | Age: 30
End: 2025-01-27

## 2025-02-03 ENCOUNTER — LAB SERVICES (OUTPATIENT)
Dept: LAB | Age: 30
End: 2025-02-03

## 2025-02-03 ENCOUNTER — OFFICE VISIT (OUTPATIENT)
Dept: OBGYN | Age: 30
End: 2025-02-03

## 2025-02-03 VITALS
DIASTOLIC BLOOD PRESSURE: 98 MMHG | HEART RATE: 89 BPM | OXYGEN SATURATION: 95 % | WEIGHT: 229.5 LBS | SYSTOLIC BLOOD PRESSURE: 160 MMHG | BODY MASS INDEX: 43.36 KG/M2 | TEMPERATURE: 97.2 F | RESPIRATION RATE: 18 BRPM

## 2025-02-03 DIAGNOSIS — I10 HYPERTENSION, UNSPECIFIED TYPE: ICD-10-CM

## 2025-02-03 DIAGNOSIS — E11.65 TYPE 2 DIABETES MELLITUS WITH HYPERGLYCEMIA, WITHOUT LONG-TERM CURRENT USE OF INSULIN (CMD): ICD-10-CM

## 2025-02-03 DIAGNOSIS — Z11.3 SCREENING FOR STDS (SEXUALLY TRANSMITTED DISEASES): Primary | ICD-10-CM

## 2025-02-03 DIAGNOSIS — Z11.3 SCREENING FOR STDS (SEXUALLY TRANSMITTED DISEASES): ICD-10-CM

## 2025-02-03 LAB
ALBUMIN SERPL-MCNC: 4 G/DL (ref 3.4–5)
ALBUMIN/GLOB SERPL: 1.1 {RATIO} (ref 1–2.4)
ALP SERPL-CCNC: 95 UNITS/L (ref 45–117)
ALT SERPL-CCNC: 18 UNITS/L
ANION GAP SERPL CALC-SCNC: 16 MMOL/L (ref 7–19)
AST SERPL-CCNC: 11 UNITS/L
BILIRUB SERPL-MCNC: 0.4 MG/DL (ref 0.2–1)
BUN SERPL-MCNC: 15 MG/DL (ref 6–20)
BUN/CREAT SERPL: 19 (ref 7–25)
CALCIUM SERPL-MCNC: 9.4 MG/DL (ref 8.4–10.2)
CHLORIDE SERPL-SCNC: 104 MMOL/L (ref 97–110)
CO2 SERPL-SCNC: 25 MMOL/L (ref 21–32)
CREAT SERPL-MCNC: 0.79 MG/DL (ref 0.51–0.95)
CREAT UR-MCNC: 91.1 MG/DL
EGFRCR SERPLBLD CKD-EPI 2021: >90 ML/MIN/{1.73_M2}
FASTING DURATION TIME PATIENT: ABNORMAL H
GLOBULIN SER-MCNC: 3.5 G/DL (ref 2–4)
GLUCOSE SERPL-MCNC: 241 MG/DL (ref 70–99)
HBA1C MFR BLD: 6.3 % (ref 4.5–5.6)
HBV SURFACE AG SER QL: NEGATIVE
HCV AB SER QL: NEGATIVE
HIV 1+2 AB+HIV1 P24 AG SERPL QL IA: NONREACTIVE
MICROALBUMIN UR-MCNC: 5.51 MG/DL
MICROALBUMIN/CREAT UR: 60.5 MG/G
POTASSIUM SERPL-SCNC: 4.3 MMOL/L (ref 3.4–5.1)
PROT SERPL-MCNC: 7.5 G/DL (ref 6.4–8.2)
SODIUM SERPL-SCNC: 141 MMOL/L (ref 135–145)

## 2025-02-03 PROCEDURE — 87340 HEPATITIS B SURFACE AG IA: CPT | Performed by: CLINICAL MEDICAL LABORATORY

## 2025-02-03 PROCEDURE — 99213 OFFICE O/P EST LOW 20 MIN: CPT | Performed by: NURSE PRACTITIONER

## 2025-02-03 PROCEDURE — 86592 SYPHILIS TEST NON-TREP QUAL: CPT | Performed by: CLINICAL MEDICAL LABORATORY

## 2025-02-03 PROCEDURE — 87591 N.GONORRHOEAE DNA AMP PROB: CPT | Performed by: INTERNAL MEDICINE

## 2025-02-03 PROCEDURE — 86803 HEPATITIS C AB TEST: CPT | Performed by: CLINICAL MEDICAL LABORATORY

## 2025-02-03 PROCEDURE — 3080F DIAST BP >= 90 MM HG: CPT | Performed by: NURSE PRACTITIONER

## 2025-02-03 PROCEDURE — 82043 UR ALBUMIN QUANTITATIVE: CPT | Performed by: INTERNAL MEDICINE

## 2025-02-03 PROCEDURE — 83036 HEMOGLOBIN GLYCOSYLATED A1C: CPT | Performed by: INTERNAL MEDICINE

## 2025-02-03 PROCEDURE — 87389 HIV-1 AG W/HIV-1&-2 AB AG IA: CPT | Performed by: CLINICAL MEDICAL LABORATORY

## 2025-02-03 PROCEDURE — 80053 COMPREHEN METABOLIC PANEL: CPT | Performed by: INTERNAL MEDICINE

## 2025-02-03 PROCEDURE — 82570 ASSAY OF URINE CREATININE: CPT | Performed by: INTERNAL MEDICINE

## 2025-02-03 PROCEDURE — 87661 TRICHOMONAS VAGINALIS AMPLIF: CPT | Performed by: INTERNAL MEDICINE

## 2025-02-03 PROCEDURE — 3077F SYST BP >= 140 MM HG: CPT | Performed by: NURSE PRACTITIONER

## 2025-02-03 PROCEDURE — 36415 COLL VENOUS BLD VENIPUNCTURE: CPT

## 2025-02-03 PROCEDURE — 87491 CHLMYD TRACH DNA AMP PROBE: CPT | Performed by: INTERNAL MEDICINE

## 2025-02-04 LAB — RPR SER QL: NONREACTIVE

## 2025-02-05 LAB
C TRACH RRNA SPEC QL NAA+PROBE: NEGATIVE
Lab: NORMAL
N GONORRHOEA RRNA SPEC QL NAA+PROBE: NEGATIVE
T VAGINALIS RRNA SPEC QL NAA+PROBE: NEGATIVE

## 2025-02-06 DIAGNOSIS — R80.9 MICROALBUMINURIA: Primary | ICD-10-CM

## 2025-02-18 ENCOUNTER — APPOINTMENT (OUTPATIENT)
Dept: FAMILY MEDICINE | Age: 30
End: 2025-02-18

## 2025-02-19 ASSESSMENT — ENCOUNTER SYMPTOMS
DECREASED NEED FOR SLEEP: 0
DELUSIONS: 0
HALLUCINATIONS: 0
PARANOIA: 0

## 2025-02-20 ENCOUNTER — APPOINTMENT (OUTPATIENT)
Age: 30
End: 2025-02-20

## 2025-02-20 DIAGNOSIS — F41.1 GAD (GENERALIZED ANXIETY DISORDER): Primary | ICD-10-CM

## 2025-02-20 DIAGNOSIS — F33.1 MAJOR DEPRESSIVE DISORDER, RECURRENT EPISODE, MODERATE (CMD): ICD-10-CM

## 2025-02-20 PROCEDURE — 99214 OFFICE O/P EST MOD 30 MIN: CPT | Performed by: STUDENT IN AN ORGANIZED HEALTH CARE EDUCATION/TRAINING PROGRAM

## 2025-02-20 RX ORDER — HYDROXYZINE HYDROCHLORIDE 10 MG/1
10 TABLET, FILM COATED ORAL DAILY PRN
Qty: 30 TABLET | Refills: 0 | Status: SHIPPED | OUTPATIENT
Start: 2025-02-20

## 2025-02-26 ENCOUNTER — APPOINTMENT (OUTPATIENT)
Dept: FAMILY MEDICINE | Age: 30
End: 2025-02-26

## 2025-02-26 VITALS
HEART RATE: 93 BPM | WEIGHT: 224.6 LBS | DIASTOLIC BLOOD PRESSURE: 82 MMHG | OXYGEN SATURATION: 98 % | HEIGHT: 61 IN | SYSTOLIC BLOOD PRESSURE: 142 MMHG | BODY MASS INDEX: 42.41 KG/M2 | TEMPERATURE: 98.3 F

## 2025-02-26 DIAGNOSIS — E28.2 PCOS (POLYCYSTIC OVARIAN SYNDROME): ICD-10-CM

## 2025-02-26 DIAGNOSIS — E66.01 OBESITY, MORBID, BMI 40.0-49.9  (CMD): ICD-10-CM

## 2025-02-26 DIAGNOSIS — F33.1 MAJOR DEPRESSIVE DISORDER, RECURRENT EPISODE, MODERATE (CMD): ICD-10-CM

## 2025-02-26 DIAGNOSIS — E78.2 MIXED HYPERLIPIDEMIA: ICD-10-CM

## 2025-02-26 DIAGNOSIS — R80.9 MICROALBUMINURIA: ICD-10-CM

## 2025-02-26 DIAGNOSIS — I15.2 HYPERTENSION DUE TO ENDOCRINE DISORDER: ICD-10-CM

## 2025-02-26 DIAGNOSIS — E11.65 TYPE 2 DIABETES MELLITUS WITH HYPERGLYCEMIA, WITHOUT LONG-TERM CURRENT USE OF INSULIN (CMD): Primary | ICD-10-CM

## 2025-02-26 DIAGNOSIS — F41.1 GAD (GENERALIZED ANXIETY DISORDER): ICD-10-CM

## 2025-02-26 PROBLEM — R74.8 ELEVATED ALKALINE PHOSPHATASE LEVEL: Status: RESOLVED | Noted: 2024-10-22 | Resolved: 2025-02-26

## 2025-02-26 RX ORDER — LANCETS 30 GAUGE
EACH MISCELLANEOUS DAILY
COMMUNITY
Start: 2024-11-19 | End: 2025-02-28 | Stop reason: ALTCHOICE

## 2025-02-26 RX ORDER — BLOOD SUGAR DIAGNOSTIC
STRIP MISCELLANEOUS
Qty: 500 STRIP | Refills: 0 | Status: SHIPPED | OUTPATIENT
Start: 2025-02-26

## 2025-02-26 RX ORDER — BLOOD SUGAR DIAGNOSTIC
STRIP MISCELLANEOUS
COMMUNITY
End: 2025-02-26 | Stop reason: SDUPTHER

## 2025-02-26 ASSESSMENT — PATIENT HEALTH QUESTIONNAIRE - PHQ9
1. LITTLE INTEREST OR PLEASURE IN DOING THINGS: NOT AT ALL
SUM OF ALL RESPONSES TO PHQ9 QUESTIONS 1 AND 2: 0
CLINICAL INTERPRETATION OF PHQ2 SCORE: NO FURTHER SCREENING NEEDED
SUM OF ALL RESPONSES TO PHQ9 QUESTIONS 1 AND 2: 0
2. FEELING DOWN, DEPRESSED OR HOPELESS: NOT AT ALL

## 2025-02-27 ENCOUNTER — E-ADVICE (OUTPATIENT)
Dept: FAMILY MEDICINE | Age: 30
End: 2025-02-27

## 2025-02-27 DIAGNOSIS — E11.65 TYPE 2 DIABETES MELLITUS WITH HYPERGLYCEMIA, WITHOUT LONG-TERM CURRENT USE OF INSULIN (CMD): Primary | ICD-10-CM

## 2025-02-28 RX ORDER — ALCOHOL ANTISEPTIC PADS
PADS, MEDICATED (EA) TOPICAL
Qty: 100 EACH | Refills: 3 | Status: SHIPPED | OUTPATIENT
Start: 2025-02-28

## 2025-03-01 PROBLEM — R80.9 MICROALBUMINURIA: Status: ACTIVE | Noted: 2025-03-01

## 2025-04-05 DIAGNOSIS — I15.2 HYPERTENSION DUE TO ENDOCRINE DISORDER: ICD-10-CM

## 2025-04-08 RX ORDER — LOSARTAN POTASSIUM 50 MG/1
50 TABLET ORAL DAILY
Qty: 90 TABLET | Refills: 1 | Status: SHIPPED | OUTPATIENT
Start: 2025-04-08

## 2025-04-16 ENCOUNTER — APPOINTMENT (OUTPATIENT)
Dept: NEPHROLOGY | Age: 30
End: 2025-04-16

## 2025-04-16 ENCOUNTER — E-ADVICE (OUTPATIENT)
Dept: FAMILY MEDICINE | Age: 30
End: 2025-04-16

## 2025-04-18 ENCOUNTER — OFFICE VISIT (OUTPATIENT)
Dept: FAMILY MEDICINE | Age: 30
End: 2025-04-18

## 2025-04-18 VITALS
HEART RATE: 70 BPM | WEIGHT: 224 LBS | DIASTOLIC BLOOD PRESSURE: 84 MMHG | BODY MASS INDEX: 42.29 KG/M2 | HEIGHT: 61 IN | TEMPERATURE: 96.8 F | SYSTOLIC BLOOD PRESSURE: 140 MMHG

## 2025-04-18 DIAGNOSIS — R80.9 MICROALBUMINURIA: ICD-10-CM

## 2025-04-18 DIAGNOSIS — Z51.81 MEDICATION MONITORING ENCOUNTER: ICD-10-CM

## 2025-04-18 DIAGNOSIS — I10 HYPERTENSION, UNSPECIFIED TYPE: Primary | ICD-10-CM

## 2025-04-18 DIAGNOSIS — Z87.898 HISTORY OF DYSURIA: ICD-10-CM

## 2025-04-18 PROCEDURE — 99214 OFFICE O/P EST MOD 30 MIN: CPT | Performed by: FAMILY MEDICINE

## 2025-04-18 PROCEDURE — 3077F SYST BP >= 140 MM HG: CPT | Performed by: FAMILY MEDICINE

## 2025-04-18 PROCEDURE — 3079F DIAST BP 80-89 MM HG: CPT | Performed by: FAMILY MEDICINE

## 2025-04-18 ASSESSMENT — ENCOUNTER SYMPTOMS
FEVER: 0
UNEXPECTED WEIGHT CHANGE: 0
DIARRHEA: 0
WEAKNESS: 0
DIZZINESS: 0
APPETITE CHANGE: 0
ACTIVITY CHANGE: 0
CHILLS: 0
NAUSEA: 0
CONSTIPATION: 0
FATIGUE: 0
LIGHT-HEADEDNESS: 0
VOMITING: 0
SHORTNESS OF BREATH: 0
NERVOUS/ANXIOUS: 0
HEADACHES: 0
ABDOMINAL PAIN: 0
COUGH: 0

## 2025-04-18 ASSESSMENT — PATIENT HEALTH QUESTIONNAIRE - PHQ9
CLINICAL INTERPRETATION OF PHQ2 SCORE: NO FURTHER SCREENING NEEDED
2. FEELING DOWN, DEPRESSED OR HOPELESS: NOT AT ALL
SUM OF ALL RESPONSES TO PHQ9 QUESTIONS 1 AND 2: 0
1. LITTLE INTEREST OR PLEASURE IN DOING THINGS: NOT AT ALL
SUM OF ALL RESPONSES TO PHQ9 QUESTIONS 1 AND 2: 0

## 2025-04-20 ASSESSMENT — ENCOUNTER SYMPTOMS
DECREASED NEED FOR SLEEP: 0
DELUSIONS: 0
PARANOIA: 0
HALLUCINATIONS: 0

## 2025-04-21 ENCOUNTER — APPOINTMENT (OUTPATIENT)
Age: 30
End: 2025-04-21

## 2025-04-21 DIAGNOSIS — F33.1 MAJOR DEPRESSIVE DISORDER, RECURRENT EPISODE, MODERATE (CMD): ICD-10-CM

## 2025-04-21 DIAGNOSIS — F41.1 GAD (GENERALIZED ANXIETY DISORDER): Primary | ICD-10-CM

## 2025-04-21 PROCEDURE — 99214 OFFICE O/P EST MOD 30 MIN: CPT | Performed by: STUDENT IN AN ORGANIZED HEALTH CARE EDUCATION/TRAINING PROGRAM

## 2025-04-21 RX ORDER — HYDROXYZINE HYDROCHLORIDE 10 MG/1
10 TABLET, FILM COATED ORAL DAILY PRN
Qty: 30 TABLET | Refills: 1 | Status: SHIPPED | OUTPATIENT
Start: 2025-04-21

## 2025-05-10 ENCOUNTER — E-ADVICE (OUTPATIENT)
Age: 30
End: 2025-05-10

## 2025-05-12 ASSESSMENT — ENCOUNTER SYMPTOMS
HALLUCINATIONS: 0
DECREASED NEED FOR SLEEP: 0
DELUSIONS: 0
PARANOIA: 0

## 2025-05-13 ENCOUNTER — BEHAVIORAL HEALTH (OUTPATIENT)
Age: 30
End: 2025-05-13

## 2025-05-13 DIAGNOSIS — F33.1 MAJOR DEPRESSIVE DISORDER, RECURRENT EPISODE, MODERATE (CMD): ICD-10-CM

## 2025-05-13 DIAGNOSIS — F51.01 PRIMARY INSOMNIA: ICD-10-CM

## 2025-05-13 DIAGNOSIS — F41.1 GAD (GENERALIZED ANXIETY DISORDER): Primary | ICD-10-CM

## 2025-05-13 PROCEDURE — 99214 OFFICE O/P EST MOD 30 MIN: CPT | Performed by: STUDENT IN AN ORGANIZED HEALTH CARE EDUCATION/TRAINING PROGRAM

## 2025-05-13 RX ORDER — TRAZODONE HYDROCHLORIDE 50 MG/1
25-50 TABLET ORAL NIGHTLY PRN
Qty: 30 TABLET | Refills: 0 | Status: SHIPPED | OUTPATIENT
Start: 2025-05-13

## 2025-05-19 ENCOUNTER — APPOINTMENT (OUTPATIENT)
Dept: FAMILY MEDICINE | Age: 30
End: 2025-05-19

## 2025-06-02 DIAGNOSIS — I15.2 HYPERTENSION DUE TO ENDOCRINE DISORDER: ICD-10-CM

## 2025-06-03 RX ORDER — LOSARTAN POTASSIUM 50 MG/1
50 TABLET ORAL DAILY
Qty: 90 TABLET | Refills: 1 | Status: SHIPPED | OUTPATIENT
Start: 2025-06-03

## 2025-06-10 ENCOUNTER — APPOINTMENT (OUTPATIENT)
Dept: FAMILY MEDICINE | Age: 30
End: 2025-06-10

## 2025-06-10 VITALS
HEIGHT: 61 IN | TEMPERATURE: 98 F | WEIGHT: 218.6 LBS | OXYGEN SATURATION: 99 % | HEART RATE: 88 BPM | SYSTOLIC BLOOD PRESSURE: 108 MMHG | DIASTOLIC BLOOD PRESSURE: 68 MMHG | BODY MASS INDEX: 41.27 KG/M2

## 2025-06-10 DIAGNOSIS — I15.2 HYPERTENSION DUE TO ENDOCRINE DISORDER: ICD-10-CM

## 2025-06-10 DIAGNOSIS — E11.65 TYPE 2 DIABETES MELLITUS WITH HYPERGLYCEMIA, WITHOUT LONG-TERM CURRENT USE OF INSULIN (CMD): Primary | ICD-10-CM

## 2025-06-10 DIAGNOSIS — E66.01 OBESITY, MORBID, BMI 40.0-49.9  (CMD): ICD-10-CM

## 2025-06-10 DIAGNOSIS — E78.2 MIXED HYPERLIPIDEMIA: ICD-10-CM

## 2025-06-10 PROCEDURE — 3074F SYST BP LT 130 MM HG: CPT

## 2025-06-10 PROCEDURE — 99214 OFFICE O/P EST MOD 30 MIN: CPT

## 2025-06-10 PROCEDURE — 3078F DIAST BP <80 MM HG: CPT

## 2025-06-10 RX ORDER — FLUTICASONE PROPIONATE 50 MCG
SPRAY, SUSPENSION (ML) NASAL
COMMUNITY
Start: 2025-06-04

## 2025-06-10 RX ORDER — TIRZEPATIDE 5 MG/.5ML
5 INJECTION, SOLUTION SUBCUTANEOUS
Qty: 2 ML | Refills: 3 | Status: SHIPPED | OUTPATIENT
Start: 2025-06-10

## 2025-06-10 RX ORDER — CYCLOSPORINE 0.5 MG/ML
1 EMULSION OPHTHALMIC 2 TIMES DAILY
COMMUNITY
Start: 2025-02-06

## 2025-06-10 RX ORDER — LOSARTAN POTASSIUM 50 MG/1
50 TABLET ORAL 2 TIMES DAILY
Qty: 180 TABLET | Refills: 1 | Status: SHIPPED | OUTPATIENT
Start: 2025-06-10

## 2025-06-10 RX ORDER — METFORMIN HYDROCHLORIDE 500 MG/1
2 TABLET, EXTENDED RELEASE ORAL 2 TIMES DAILY
COMMUNITY
Start: 2025-01-02

## 2025-06-19 ASSESSMENT — ENCOUNTER SYMPTOMS
PARANOIA: 0
HALLUCINATIONS: 0
DECREASED NEED FOR SLEEP: 0
DELUSIONS: 0

## 2025-06-20 ENCOUNTER — APPOINTMENT (OUTPATIENT)
Age: 30
End: 2025-06-20

## 2025-06-20 DIAGNOSIS — F33.1 MAJOR DEPRESSIVE DISORDER, RECURRENT EPISODE, MODERATE (CMD): ICD-10-CM

## 2025-06-20 DIAGNOSIS — F51.01 PRIMARY INSOMNIA: ICD-10-CM

## 2025-06-20 DIAGNOSIS — F41.1 GAD (GENERALIZED ANXIETY DISORDER): Primary | ICD-10-CM

## 2025-06-20 PROCEDURE — 99214 OFFICE O/P EST MOD 30 MIN: CPT | Performed by: STUDENT IN AN ORGANIZED HEALTH CARE EDUCATION/TRAINING PROGRAM

## 2025-06-20 RX ORDER — TRAZODONE HYDROCHLORIDE 100 MG/1
100 TABLET ORAL NIGHTLY PRN
Qty: 30 TABLET | Refills: 0 | Status: SHIPPED | OUTPATIENT
Start: 2025-06-20

## 2025-06-20 RX ORDER — HYDROXYZINE HYDROCHLORIDE 50 MG/1
50 TABLET, FILM COATED ORAL DAILY PRN
Qty: 30 TABLET | Refills: 0 | Status: SHIPPED | OUTPATIENT
Start: 2025-06-20

## 2025-07-04 ENCOUNTER — E-ADVICE (OUTPATIENT)
Dept: FAMILY MEDICINE | Age: 30
End: 2025-07-04

## 2025-07-07 ENCOUNTER — TELEPHONE (OUTPATIENT)
Dept: FAMILY MEDICINE | Age: 30
End: 2025-07-07

## 2025-07-07 ENCOUNTER — OFFICE VISIT (OUTPATIENT)
Dept: FAMILY MEDICINE | Age: 30
End: 2025-07-07

## 2025-07-07 VITALS
OXYGEN SATURATION: 99 % | HEART RATE: 74 BPM | TEMPERATURE: 98 F | BODY MASS INDEX: 40.86 KG/M2 | HEIGHT: 61 IN | SYSTOLIC BLOOD PRESSURE: 138 MMHG | DIASTOLIC BLOOD PRESSURE: 88 MMHG | WEIGHT: 216.4 LBS

## 2025-07-07 DIAGNOSIS — Z86.32 HISTORY OF GESTATIONAL DIABETES: ICD-10-CM

## 2025-07-07 DIAGNOSIS — J30.9 ALLERGIC RHINITIS, UNSPECIFIED SEASONALITY, UNSPECIFIED TRIGGER: ICD-10-CM

## 2025-07-07 DIAGNOSIS — Z3A.01 LESS THAN 8 WEEKS GESTATION OF PREGNANCY (CMD): Primary | ICD-10-CM

## 2025-07-07 DIAGNOSIS — E11.65 TYPE 2 DIABETES MELLITUS WITH HYPERGLYCEMIA, WITHOUT LONG-TERM CURRENT USE OF INSULIN (CMD): ICD-10-CM

## 2025-07-07 DIAGNOSIS — Z87.59 HISTORY OF PRE-ECLAMPSIA: ICD-10-CM

## 2025-07-07 DIAGNOSIS — I15.2 HYPERTENSION DUE TO ENDOCRINE DISORDER: ICD-10-CM

## 2025-07-07 RX ORDER — LABETALOL 100 MG/1
100 TABLET, FILM COATED ORAL 2 TIMES DAILY
Qty: 60 TABLET | Refills: 1 | Status: SHIPPED | OUTPATIENT
Start: 2025-07-07

## 2025-07-07 RX ORDER — FLUTICASONE PROPIONATE 50 MCG
1 SPRAY, SUSPENSION (ML) NASAL DAILY
Qty: 16 G | Refills: 3 | Status: SHIPPED | OUTPATIENT
Start: 2025-07-07

## 2025-07-07 ASSESSMENT — PATIENT HEALTH QUESTIONNAIRE - PHQ9
1. LITTLE INTEREST OR PLEASURE IN DOING THINGS: NOT AT ALL
SUM OF ALL RESPONSES TO PHQ9 QUESTIONS 1 AND 2: 0
1. LITTLE INTEREST OR PLEASURE IN DOING THINGS: NOT AT ALL
2. FEELING DOWN, DEPRESSED OR HOPELESS: NOT AT ALL
SUM OF ALL RESPONSES TO PHQ9 QUESTIONS 1 AND 2: 0
SUM OF ALL RESPONSES TO PHQ9 QUESTIONS 1 AND 2: 0
CLINICAL INTERPRETATION OF PHQ2 SCORE: NO FURTHER SCREENING NEEDED
2. FEELING DOWN, DEPRESSED OR HOPELESS: NOT AT ALL
SUM OF ALL RESPONSES TO PHQ9 QUESTIONS 1 AND 2: 0
CLINICAL INTERPRETATION OF PHQ2 SCORE: NO FURTHER SCREENING NEEDED

## 2025-07-09 ENCOUNTER — APPOINTMENT (OUTPATIENT)
Dept: OBGYN | Age: 30
End: 2025-07-09

## 2025-07-09 VITALS — BODY MASS INDEX: 40.02 KG/M2 | WEIGHT: 212 LBS | HEIGHT: 61 IN

## 2025-07-09 DIAGNOSIS — N92.6 MISSED PERIOD: Primary | ICD-10-CM

## 2025-07-09 RX ORDER — VITAMIN A ACETATE, BETA CAROTENE, ASCORBIC ACID, CHOLECALCIFEROL, .ALPHA.-TOCOPHEROL ACETATE, DL-, THIAMINE MONONITRATE, RIBOFLAVIN, NIACINAMIDE, PYRIDOXINE HYDROCHLORIDE, FOLIC ACID, CYANOCOBALAMIN, CALCIUM CARBONATE, FERROUS FUMARATE, ZINC OXIDE, CUPRIC OXIDE 3080; 12; 120; 400; 1; 1.84; 3; 20; 22; 920; 25; 200; 27; 10; 2 [IU]/1; UG/1; MG/1; [IU]/1; MG/1; MG/1; MG/1; MG/1; MG/1; [IU]/1; MG/1; MG/1; MG/1; MG/1; MG/1
1 TABLET, FILM COATED ORAL DAILY
COMMUNITY

## 2025-07-16 ENCOUNTER — LAB SERVICES (OUTPATIENT)
Dept: LAB | Age: 30
End: 2025-07-16

## 2025-07-16 ENCOUNTER — APPOINTMENT (OUTPATIENT)
Dept: ULTRASOUND IMAGING | Age: 30
End: 2025-07-16

## 2025-07-16 ENCOUNTER — APPOINTMENT (OUTPATIENT)
Dept: OBGYN | Age: 30
End: 2025-07-16

## 2025-07-16 ENCOUNTER — WALK IN (OUTPATIENT)
Dept: URGENT CARE | Age: 30
End: 2025-07-16

## 2025-07-16 VITALS
HEIGHT: 61 IN | DIASTOLIC BLOOD PRESSURE: 86 MMHG | BODY MASS INDEX: 40.67 KG/M2 | SYSTOLIC BLOOD PRESSURE: 139 MMHG | WEIGHT: 215.39 LBS | HEART RATE: 76 BPM | OXYGEN SATURATION: 100 %

## 2025-07-16 VITALS
DIASTOLIC BLOOD PRESSURE: 90 MMHG | OXYGEN SATURATION: 99 % | SYSTOLIC BLOOD PRESSURE: 131 MMHG | HEART RATE: 81 BPM | RESPIRATION RATE: 18 BRPM | TEMPERATURE: 97.9 F

## 2025-07-16 DIAGNOSIS — O09.91 HIGH-RISK PREGNANCY IN FIRST TRIMESTER (CMD): ICD-10-CM

## 2025-07-16 DIAGNOSIS — E11.65 TYPE 2 DIABETES MELLITUS WITH HYPERGLYCEMIA, WITHOUT LONG-TERM CURRENT USE OF INSULIN (CMD): ICD-10-CM

## 2025-07-16 DIAGNOSIS — R05.1 ACUTE COUGH: ICD-10-CM

## 2025-07-16 DIAGNOSIS — O24.911 DIABETES MELLITUS AFFECTING PREGNANCY IN FIRST TRIMESTER (CMD): ICD-10-CM

## 2025-07-16 DIAGNOSIS — R51.9 GENERALIZED HEADACHES: Primary | ICD-10-CM

## 2025-07-16 DIAGNOSIS — N92.6 MISSED PERIOD: ICD-10-CM

## 2025-07-16 DIAGNOSIS — Z87.59 HISTORY OF PRE-ECLAMPSIA: ICD-10-CM

## 2025-07-16 DIAGNOSIS — R53.83 OTHER FATIGUE: ICD-10-CM

## 2025-07-16 DIAGNOSIS — O09.899 HISTORY OF PRETERM DELIVERY, CURRENTLY PREGNANT (CMD): ICD-10-CM

## 2025-07-16 DIAGNOSIS — Z12.4 PAP SMEAR FOR CERVICAL CANCER SCREENING: ICD-10-CM

## 2025-07-16 DIAGNOSIS — O99.210 OBESITY IN PREGNANCY (CMD): ICD-10-CM

## 2025-07-16 DIAGNOSIS — Z98.891 HISTORY OF C-SECTION: ICD-10-CM

## 2025-07-16 DIAGNOSIS — J20.8 ACUTE BRONCHITIS, BACTERIAL: ICD-10-CM

## 2025-07-16 DIAGNOSIS — Z3A.08 8 WEEKS GESTATION OF PREGNANCY (CMD): Primary | ICD-10-CM

## 2025-07-16 DIAGNOSIS — B96.89 ACUTE BRONCHITIS, BACTERIAL: ICD-10-CM

## 2025-07-16 DIAGNOSIS — I15.2 HYPERTENSION DUE TO ENDOCRINE DISORDER: ICD-10-CM

## 2025-07-16 DIAGNOSIS — E78.2 MIXED HYPERLIPIDEMIA: ICD-10-CM

## 2025-07-16 DIAGNOSIS — Z81.8 FAMILY HISTORY OF AUTISM: ICD-10-CM

## 2025-07-16 LAB
FLUAV AG UPPER RESP QL IA.RAPID: NEGATIVE
FLUBV AG UPPER RESP QL IA.RAPID: NEGATIVE
SARS-COV+SARS-COV-2 AG RESP QL IA.RAPID: NOT DETECTED
TEST LOT EXPIRATION DATE: NORMAL
TEST LOT NUMBER: NORMAL

## 2025-07-16 PROCEDURE — 76801 OB US < 14 WKS SINGLE FETUS: CPT | Performed by: OBSTETRICS & GYNECOLOGY

## 2025-07-16 RX ORDER — CEFUROXIME AXETIL 500 MG/1
500 TABLET ORAL 2 TIMES DAILY
Qty: 10 TABLET | Refills: 0 | Status: SHIPPED | OUTPATIENT
Start: 2025-07-16 | End: 2025-07-21

## 2025-07-16 RX ORDER — ALBUTEROL SULFATE 90 UG/1
2 INHALANT RESPIRATORY (INHALATION) EVERY 4 HOURS PRN
Qty: 1 EACH | Refills: 0 | Status: SHIPPED | OUTPATIENT
Start: 2025-07-16

## 2025-07-16 ASSESSMENT — ENCOUNTER SYMPTOMS
SWEATS: 0
SHORTNESS OF BREATH: 1
GASTROINTESTINAL NEGATIVE: 1
WEIGHT LOSS: 0
EYES NEGATIVE: 1
ALLERGIC/IMMUNOLOGIC NEGATIVE: 1
WHEEZING: 1
NEUROLOGICAL NEGATIVE: 1
COUGH: 1
COUGH: 1
CONSTITUTIONAL NEGATIVE: 1
PSYCHIATRIC NEGATIVE: 1
SORE THROAT: 0
FEVER: 0
HEMATOLOGIC/LYMPHATIC NEGATIVE: 1
ENDOCRINE NEGATIVE: 1

## 2025-07-16 ASSESSMENT — PAIN SCALES - GENERAL: PAINLEVEL_OUTOF10: 8

## 2025-07-16 ASSESSMENT — EDINBURGH POSTNATAL DEPRESSION SCALE (EPDS)
I HAVE BEEN ANXIOUS OR WORRIED FOR NO GOOD REASON: NO, NOT AT ALL
I HAVE BEEN SO UNHAPPY THAT I HAVE BEEN CRYING: NO, NEVER
I HAVE FELT SCARED OR PANICKY FOR NO GOOD REASON: NO, NOT AT ALL
TOTAL SCORE: 0
THINGS HAVE BEEN GETTING ON TOP OF ME: NO, I HAVE BEEN COPING AS WELL AS EVER
THE THOUGHT OF HARMING MYSELF HAS OCCURRED TO ME: NEVER
I HAVE BEEN SO UNHAPPY THAT I HAVE HAD DIFFICULTY SLEEPING: NOT AT ALL
I HAVE BLAMED MYSELF UNNECESSARILY WHEN THINGS WENT WRONG: NO, NEVER
I HAVE BEEN ABLE TO LAUGH AND SEE THE FUNNY SIDE OF THINGS: AS MUCH AS I ALWAYS COULD
I HAVE LOOKED FORWARD WITH ENJOYMENT TO THINGS: AS MUCH AS I EVER DID
I HAVE FELT SAD OR MISERABLE: NO, NOT AT ALL

## 2025-07-17 LAB
BACTERIA UR CULT: NO GROWTH
C TRACH RRNA SPEC QL NAA+PROBE: NEGATIVE
N GONORRHOEA RRNA SPEC QL NAA+PROBE: NEGATIVE
SERVICE CMNT-IMP: NORMAL
SERVICE CMNT-IMP: NORMAL

## 2025-07-20 ASSESSMENT — ENCOUNTER SYMPTOMS
DELUSIONS: 0
HALLUCINATIONS: 0
DECREASED NEED FOR SLEEP: 0
PARANOIA: 0

## 2025-07-21 ENCOUNTER — APPOINTMENT (OUTPATIENT)
Age: 30
End: 2025-07-21

## 2025-07-21 DIAGNOSIS — F33.1 MAJOR DEPRESSIVE DISORDER, RECURRENT EPISODE, MODERATE (CMD): ICD-10-CM

## 2025-07-21 DIAGNOSIS — F51.01 PRIMARY INSOMNIA: ICD-10-CM

## 2025-07-21 DIAGNOSIS — F41.1 GAD (GENERALIZED ANXIETY DISORDER): Primary | ICD-10-CM

## 2025-07-21 PROCEDURE — 99214 OFFICE O/P EST MOD 30 MIN: CPT | Performed by: STUDENT IN AN ORGANIZED HEALTH CARE EDUCATION/TRAINING PROGRAM

## 2025-07-22 LAB
CASE RPRT: NORMAL
CLINICAL INFO: NORMAL
CYTOLOGY CVX/VAG STUDY: NORMAL
PAP EDUCATIONAL NOTE: NORMAL
STAT OF ADQ CVX/VAG CYTO-IMP: NORMAL

## 2025-07-28 ENCOUNTER — APPOINTMENT (OUTPATIENT)
Dept: OBGYN | Age: 30
End: 2025-07-28

## 2025-07-28 VITALS
HEIGHT: 61 IN | SYSTOLIC BLOOD PRESSURE: 122 MMHG | OXYGEN SATURATION: 98 % | DIASTOLIC BLOOD PRESSURE: 86 MMHG | RESPIRATION RATE: 20 BRPM | BODY MASS INDEX: 40.46 KG/M2 | TEMPERATURE: 98.4 F | HEART RATE: 73 BPM | WEIGHT: 214.29 LBS

## 2025-07-28 DIAGNOSIS — O24.911 DIABETES MELLITUS AFFECTING PREGNANCY IN FIRST TRIMESTER (CMD): ICD-10-CM

## 2025-07-28 DIAGNOSIS — O09.91 HIGH-RISK PREGNANCY IN FIRST TRIMESTER (CMD): ICD-10-CM

## 2025-07-28 DIAGNOSIS — Z3A.08 8 WEEKS GESTATION OF PREGNANCY (CMD): Primary | ICD-10-CM

## 2025-07-28 DIAGNOSIS — Z98.891 HISTORY OF C-SECTION: ICD-10-CM

## 2025-07-28 DIAGNOSIS — I15.2 HYPERTENSION DUE TO ENDOCRINE DISORDER: ICD-10-CM

## 2025-07-28 DIAGNOSIS — O99.210 OBESITY IN PREGNANCY (CMD): ICD-10-CM

## 2025-07-28 DIAGNOSIS — O09.899 HISTORY OF PRETERM DELIVERY, CURRENTLY PREGNANT (CMD): ICD-10-CM

## 2025-07-29 ENCOUNTER — APPOINTMENT (OUTPATIENT)
Dept: LAB | Age: 30
End: 2025-07-29

## 2025-07-30 ENCOUNTER — APPOINTMENT (OUTPATIENT)
Dept: NEPHROLOGY | Age: 30
End: 2025-07-30

## 2025-07-30 LAB
ABO + RH BLD: NORMAL
ALBUMIN SERPL-MCNC: 3.4 G/DL (ref 3.4–5)
ALBUMIN/GLOB SERPL: 0.9 {RATIO} (ref 1–2.4)
ALP SERPL-CCNC: 86 UNITS/L (ref 45–117)
ALT SERPL-CCNC: 16 UNITS/L
ANION GAP SERPL CALC-SCNC: 13 MMOL/L (ref 7–19)
AST SERPL-CCNC: 13 UNITS/L
BILIRUB SERPL-MCNC: 0.7 MG/DL (ref 0.2–1)
BLD GP AB SCN SERPL QL GEL: NEGATIVE
BUN SERPL-MCNC: 8 MG/DL (ref 6–20)
BUN/CREAT SERPL: 12 (ref 7–25)
CALCIUM SERPL-MCNC: 9.8 MG/DL (ref 8.4–10.2)
CHLORIDE SERPL-SCNC: 105 MMOL/L (ref 97–110)
CHOLEST SERPL-MCNC: 178 MG/DL
CHOLEST/HDLC SERPL: 4.2 {RATIO}
CO2 SERPL-SCNC: 21 MMOL/L (ref 21–32)
CREAT SERPL-MCNC: 0.67 MG/DL (ref 0.51–0.95)
DEPRECATED RDW RBC: 42.1 FL (ref 39–50)
EGFRCR SERPLBLD CKD-EPI 2021: >90 ML/MIN/{1.73_M2}
ERYTHROCYTE [DISTWIDTH] IN BLOOD: 12.8 % (ref 11–15)
FASTING DURATION TIME PATIENT: 0 HOURS (ref 0–999)
GLOBULIN SER-MCNC: 3.9 G/DL (ref 2–4)
GLUCOSE SERPL-MCNC: 93 MG/DL (ref 70–99)
HBA1C MFR BLD: 5.2 % (ref 4.5–5.6)
HBV SURFACE AB SER QL: POSITIVE
HBV SURFACE AG SER QL: NEGATIVE
HCT VFR BLD CALC: 37.6 % (ref 36–46.5)
HCV AB SER QL: NEGATIVE
HDLC SERPL-MCNC: 42 MG/DL
HGB BLD-MCNC: 12.6 G/DL (ref 12–15.5)
HIV 1+2 AB+HIV1 P24 AG SERPL QL IA: NONREACTIVE
LDLC SERPL CALC-MCNC: 94 MG/DL
MCH RBC QN AUTO: 30.4 PG (ref 26–34)
MCHC RBC AUTO-ENTMCNC: 33.5 G/DL (ref 32–36.5)
MCV RBC AUTO: 90.8 FL (ref 78–100)
NONHDLC SERPL-MCNC: 136 MG/DL
NRBC BLD MANUAL-RTO: 0 /100 WBC
PLATELET # BLD AUTO: 395 K/MCL (ref 140–450)
POTASSIUM SERPL-SCNC: 3.9 MMOL/L (ref 3.4–5.1)
PROT SERPL-MCNC: 7.3 G/DL (ref 6.4–8.2)
RBC # BLD: 4.14 MIL/MCL (ref 4–5.2)
RUBV IGG SERPL IA-ACNC: 55.1 UNITS/ML
SODIUM SERPL-SCNC: 135 MMOL/L (ref 135–145)
TREPONEMA PALLIDUM IGG+IGM AB [PRESENCE] IN SERUM OR PLASMA BY IMMUNOASSAY: NONREACTIVE
TRIGL SERPL-MCNC: 208 MG/DL
WBC # BLD: 8.9 K/MCL (ref 4.2–11)

## 2025-07-31 LAB — HBV E AG SERPL QL IA: NEGATIVE

## 2025-08-01 ENCOUNTER — APPOINTMENT (OUTPATIENT)
Dept: FAMILY MEDICINE | Age: 30
End: 2025-08-01

## 2025-08-01 VITALS
OXYGEN SATURATION: 99 % | SYSTOLIC BLOOD PRESSURE: 126 MMHG | HEART RATE: 91 BPM | DIASTOLIC BLOOD PRESSURE: 84 MMHG | RESPIRATION RATE: 16 BRPM | TEMPERATURE: 97.8 F | HEIGHT: 61 IN | WEIGHT: 214.8 LBS | BODY MASS INDEX: 40.55 KG/M2

## 2025-08-01 DIAGNOSIS — R53.83 OTHER FATIGUE: Primary | ICD-10-CM

## 2025-08-01 DIAGNOSIS — Z87.59 HISTORY OF PRE-ECLAMPSIA: ICD-10-CM

## 2025-08-01 DIAGNOSIS — Z3A.10 10 WEEKS GESTATION OF PREGNANCY (CMD): ICD-10-CM

## 2025-08-01 DIAGNOSIS — R63.0 APPETITE LOSS: ICD-10-CM

## 2025-08-01 DIAGNOSIS — I15.2 HYPERTENSION DUE TO ENDOCRINE DISORDER: ICD-10-CM

## 2025-08-01 LAB — TSH SERPL-ACNC: 1.57 MCUNITS/ML (ref 0.35–5)

## 2025-08-01 RX ORDER — PYRIDOXINE HCL (VITAMIN B6) 25 MG
25 TABLET ORAL 3 TIMES DAILY PRN
Qty: 90 TABLET | Refills: 0 | Status: SHIPPED | OUTPATIENT
Start: 2025-08-01

## 2025-08-01 RX ORDER — LABETALOL 100 MG/1
100 TABLET, FILM COATED ORAL 2 TIMES DAILY
Qty: 60 TABLET | Refills: 1 | Status: SHIPPED | OUTPATIENT
Start: 2025-08-01

## 2025-08-01 ASSESSMENT — PATIENT HEALTH QUESTIONNAIRE - PHQ9
1. LITTLE INTEREST OR PLEASURE IN DOING THINGS: NOT AT ALL
SUM OF ALL RESPONSES TO PHQ9 QUESTIONS 1 AND 2: 0
2. FEELING DOWN, DEPRESSED OR HOPELESS: NOT AT ALL
CLINICAL INTERPRETATION OF PHQ2 SCORE: NO FURTHER SCREENING NEEDED
SUM OF ALL RESPONSES TO PHQ9 QUESTIONS 1 AND 2: 0

## 2025-08-07 ENCOUNTER — TELEPHONE (OUTPATIENT)
Dept: NEPHROLOGY | Age: 30
End: 2025-08-07

## 2025-08-07 ENCOUNTER — E-ADVICE (OUTPATIENT)
Dept: FAMILY MEDICINE | Age: 30
End: 2025-08-07

## 2025-08-18 ENCOUNTER — APPOINTMENT (OUTPATIENT)
Dept: OBGYN | Age: 30
End: 2025-08-18

## 2025-08-18 ENCOUNTER — OB CHECK (OUTPATIENT)
Dept: OBGYN | Age: 30
End: 2025-08-18

## 2025-08-18 VITALS
HEART RATE: 75 BPM | OXYGEN SATURATION: 98 % | BODY MASS INDEX: 40.77 KG/M2 | SYSTOLIC BLOOD PRESSURE: 138 MMHG | HEIGHT: 61 IN | DIASTOLIC BLOOD PRESSURE: 83 MMHG | WEIGHT: 215.94 LBS

## 2025-08-18 DIAGNOSIS — O24.319 PREGESTATIONAL DIABETES MELLITUS, MODIFIED WHITE CLASS B (CMD): Primary | ICD-10-CM

## 2025-08-18 DIAGNOSIS — O10.919 CHRONIC HYPERTENSION IN PREGNANCY (CMD): ICD-10-CM

## 2025-08-18 DIAGNOSIS — Z3A.12 12 WEEKS GESTATION OF PREGNANCY (CMD): ICD-10-CM

## 2025-08-18 ASSESSMENT — EDINBURGH POSTNATAL DEPRESSION SCALE (EPDS)
THINGS HAVE BEEN GETTING ON TOP OF ME: NO, I HAVE BEEN COPING AS WELL AS EVER
I HAVE BEEN SO UNHAPPY THAT I HAVE HAD DIFFICULTY SLEEPING: NOT AT ALL
I HAVE FELT SCARED OR PANICKY FOR NO GOOD REASON: NO, NOT AT ALL
I HAVE BEEN SO UNHAPPY THAT I HAVE BEEN CRYING: NO, NEVER
TOTAL SCORE: 0
I HAVE LOOKED FORWARD WITH ENJOYMENT TO THINGS: AS MUCH AS I EVER DID
THE THOUGHT OF HARMING MYSELF HAS OCCURRED TO ME: NEVER
I HAVE BEEN ANXIOUS OR WORRIED FOR NO GOOD REASON: NO, NOT AT ALL
I HAVE BEEN ABLE TO LAUGH AND SEE THE FUNNY SIDE OF THINGS: AS MUCH AS I ALWAYS COULD
I HAVE FELT SAD OR MISERABLE: NO, NOT AT ALL
I HAVE BLAMED MYSELF UNNECESSARILY WHEN THINGS WENT WRONG: NO, NEVER

## 2025-08-18 ASSESSMENT — PAIN SCALES - GENERAL: PAINLEVEL_OUTOF10: 0

## 2025-08-22 ENCOUNTER — APPOINTMENT (OUTPATIENT)
Dept: OTOLARYNGOLOGY | Age: 30
End: 2025-08-22

## 2025-08-28 ENCOUNTER — TELEPHONE (OUTPATIENT)
Dept: EDUCATION SERVICES | Age: 30
End: 2025-08-28

## 2025-09-04 ENCOUNTER — APPOINTMENT (OUTPATIENT)
Dept: OBGYN | Age: 30
End: 2025-09-04

## 2025-09-08 ENCOUNTER — APPOINTMENT (OUTPATIENT)
Dept: OBGYN | Age: 30
End: 2025-09-08

## 2025-09-10 ENCOUNTER — APPOINTMENT (OUTPATIENT)
Dept: FAMILY MEDICINE | Age: 30
End: 2025-09-10